# Patient Record
Sex: FEMALE | Race: WHITE
[De-identification: names, ages, dates, MRNs, and addresses within clinical notes are randomized per-mention and may not be internally consistent; named-entity substitution may affect disease eponyms.]

---

## 2017-11-07 ENCOUNTER — ASOB RESULT (OUTPATIENT)
Age: 27
End: 2017-11-07

## 2017-11-07 ENCOUNTER — APPOINTMENT (OUTPATIENT)
Dept: ANTEPARTUM | Facility: CLINIC | Age: 27
End: 2017-11-07
Payer: COMMERCIAL

## 2017-11-07 PROCEDURE — 76830 TRANSVAGINAL US NON-OB: CPT

## 2017-11-07 PROCEDURE — 76857 US EXAM PELVIC LIMITED: CPT

## 2018-03-19 ENCOUNTER — EMERGENCY (EMERGENCY)
Facility: HOSPITAL | Age: 28
LOS: 0 days | Discharge: HOME | End: 2018-03-19
Attending: EMERGENCY MEDICINE | Admitting: EMERGENCY MEDICINE
Payer: COMMERCIAL

## 2018-03-19 VITALS
RESPIRATION RATE: 18 BRPM | HEART RATE: 76 BPM | OXYGEN SATURATION: 100 % | DIASTOLIC BLOOD PRESSURE: 65 MMHG | SYSTOLIC BLOOD PRESSURE: 105 MMHG | TEMPERATURE: 98 F

## 2018-03-19 VITALS
HEIGHT: 64 IN | DIASTOLIC BLOOD PRESSURE: 75 MMHG | HEART RATE: 92 BPM | OXYGEN SATURATION: 100 % | SYSTOLIC BLOOD PRESSURE: 113 MMHG | TEMPERATURE: 98 F | RESPIRATION RATE: 14 BRPM | WEIGHT: 154.1 LBS

## 2018-03-19 DIAGNOSIS — M25.562 PAIN IN LEFT KNEE: ICD-10-CM

## 2018-03-19 DIAGNOSIS — Z04.1 ENCOUNTER FOR EXAMINATION AND OBSERVATION FOLLOWING TRANSPORT ACCIDENT: ICD-10-CM

## 2018-03-19 DIAGNOSIS — M25.512 PAIN IN LEFT SHOULDER: ICD-10-CM

## 2018-03-19 PROCEDURE — 99284 EMERGENCY DEPT VISIT MOD MDM: CPT

## 2018-03-19 PROCEDURE — 72190 X-RAY EXAM OF PELVIS: CPT | Mod: 26

## 2018-03-19 PROCEDURE — 73564 X-RAY EXAM KNEE 4 OR MORE: CPT | Mod: 26,LT

## 2018-03-19 PROCEDURE — 71046 X-RAY EXAM CHEST 2 VIEWS: CPT | Mod: 26

## 2018-03-19 PROCEDURE — 73030 X-RAY EXAM OF SHOULDER: CPT | Mod: 26,LT

## 2018-03-19 RX ORDER — ACETAMINOPHEN 500 MG
650 TABLET ORAL ONCE
Qty: 0 | Refills: 0 | Status: COMPLETED | OUTPATIENT
Start: 2018-03-19 | End: 2018-03-19

## 2018-03-19 RX ADMIN — Medication 650 MILLIGRAM(S): at 09:47
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
## 2021-10-29 ENCOUNTER — EMERGENCY (EMERGENCY)
Facility: HOSPITAL | Age: 31
LOS: 0 days | Discharge: HOME | End: 2021-10-29
Attending: STUDENT IN AN ORGANIZED HEALTH CARE EDUCATION/TRAINING PROGRAM
Payer: COMMERCIAL

## 2021-10-29 VITALS
SYSTOLIC BLOOD PRESSURE: 108 MMHG | DIASTOLIC BLOOD PRESSURE: 92 MMHG | HEART RATE: 91 BPM | TEMPERATURE: 99 F | RESPIRATION RATE: 18 BRPM | OXYGEN SATURATION: 99 %

## 2021-10-29 VITALS — HEIGHT: 64 IN | WEIGHT: 199.96 LBS

## 2021-10-29 DIAGNOSIS — M54.9 DORSALGIA, UNSPECIFIED: ICD-10-CM

## 2021-10-29 DIAGNOSIS — S49.91XA UNSPECIFIED INJURY OF RIGHT SHOULDER AND UPPER ARM, INITIAL ENCOUNTER: ICD-10-CM
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
## 2021-10-29 DIAGNOSIS — M25.511 PAIN IN RIGHT SHOULDER: ICD-10-CM

## 2021-10-29 DIAGNOSIS — S49.92XA UNSPECIFIED INJURY OF LEFT SHOULDER AND UPPER ARM, INITIAL ENCOUNTER: ICD-10-CM

## 2021-10-29 DIAGNOSIS — Z98.84 BARIATRIC SURGERY STATUS: ICD-10-CM

## 2021-10-29 DIAGNOSIS — M25.512 PAIN IN LEFT SHOULDER: ICD-10-CM

## 2021-10-29 DIAGNOSIS — M25.562 PAIN IN LEFT KNEE: ICD-10-CM

## 2021-10-29 DIAGNOSIS — S09.93XA UNSPECIFIED INJURY OF FACE, INITIAL ENCOUNTER: ICD-10-CM
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
## 2021-10-29 DIAGNOSIS — M25.561 PAIN IN RIGHT KNEE: ICD-10-CM

## 2021-10-29 DIAGNOSIS — Z88.6 ALLERGY STATUS TO ANALGESIC AGENT: ICD-10-CM

## 2021-10-29 DIAGNOSIS — Z90.3 ACQUIRED ABSENCE OF STOMACH [PART OF]: Chronic | ICD-10-CM

## 2021-10-29 PROCEDURE — 73030 X-RAY EXAM OF SHOULDER: CPT | Mod: RT

## 2021-10-29 PROCEDURE — 76376 3D RENDER W/INTRP POSTPROCES: CPT

## 2021-10-29 PROCEDURE — 73030 X-RAY EXAM OF SHOULDER: CPT | Mod: 26,RT

## 2021-10-29 PROCEDURE — G1004: CPT

## 2021-10-29 PROCEDURE — 76376 3D RENDER W/INTRP POSTPROCES: CPT | Mod: 26

## 2021-10-29 PROCEDURE — 70486 CT MAXILLOFACIAL W/O DYE: CPT | Mod: MG

## 2021-10-29 PROCEDURE — 99284 EMERGENCY DEPT VISIT MOD MDM: CPT | Mod: 25

## 2021-10-29 PROCEDURE — 99284 EMERGENCY DEPT VISIT MOD MDM: CPT

## 2021-10-29 PROCEDURE — 70486 CT MAXILLOFACIAL W/O DYE: CPT | Mod: 26,MG

## 2021-10-29 RX ORDER — ACETAMINOPHEN 500 MG
650 TABLET ORAL ONCE
Refills: 0 | Status: COMPLETED | OUTPATIENT
Start: 2021-10-29 | End: 2021-10-29

## 2021-10-29 RX ADMIN — Medication 650 MILLIGRAM(S): at 14:52
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
## 2021-10-29 NOTE — ED STATDOCS - ATTENDING CONTRIBUTION TO CARE
I, Phoebe Reyez DO,  performed the initial face to face bedside interview with this patient regarding history of present illness, review of symptoms and relevant past medical, social and family history.  I completed an independent physical examination.  I was the initial provider who evaluated this patient. I have signed out the follow up of any pending tests (i.e. labs, radiological studies) to the ACP.  I have communicated the patient’s plan of care and disposition with the ACP.  The history, relevant review of systems, past medical and surgical history, medical decision making, and physical examination was documented by the scribe in my presence and I attest to the accuracy of the documentation.

## 2021-10-29 NOTE — ED STATDOCS - PATIENT PORTAL LINK FT
You can access the FollowMyHealth Patient Portal offered by [facility] by registering at the following website: [email or web address]. By joining [facility]’s FollowMyHealth portal, you will also be able to view your health information using other applications (apps) compatible with our system.
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
## 2021-10-29 NOTE — ED STATDOCS - MUSCULOSKELETAL, MLM
Right sided facial pain and with soft tissue swelling. Right shoulder tenderness with restricted ROM secondary to pain.
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
## 2022-09-02 DIAGNOSIS — K21.9 GASTRO-ESOPHAGEAL REFLUX DISEASE W/OUT ESOPHAGITIS: ICD-10-CM

## 2022-09-02 DIAGNOSIS — F41.9 ANXIETY DISORDER, UNSPECIFIED: ICD-10-CM

## 2022-12-21 ENCOUNTER — OUTPATIENT (OUTPATIENT)
Dept: OUTPATIENT SERVICES | Facility: HOSPITAL | Age: 32
LOS: 1 days | Discharge: HOME | End: 2022-12-21
Payer: COMMERCIAL

## 2022-12-21 DIAGNOSIS — Z90.3 ACQUIRED ABSENCE OF STOMACH [PART OF]: Chronic | ICD-10-CM

## 2022-12-21 DIAGNOSIS — D64.9 ANEMIA, UNSPECIFIED: ICD-10-CM

## 2022-12-28 ENCOUNTER — RESULT REVIEW (OUTPATIENT)
Age: 32
End: 2022-12-28

## 2022-12-28 ENCOUNTER — APPOINTMENT (OUTPATIENT)
Dept: HEMATOLOGY ONCOLOGY | Facility: CLINIC | Age: 32
End: 2022-12-28

## 2022-12-28 VITALS
HEIGHT: 64 IN | HEART RATE: 95 BPM | SYSTOLIC BLOOD PRESSURE: 102 MMHG | RESPIRATION RATE: 18 BRPM | DIASTOLIC BLOOD PRESSURE: 54 MMHG | BODY MASS INDEX: 36.77 KG/M2 | OXYGEN SATURATION: 97 % | TEMPERATURE: 97.6 F | WEIGHT: 215.38 LBS

## 2022-12-28 DIAGNOSIS — K91.2 POSTSURGICAL MALABSORPTION, NOT ELSEWHERE CLASSIFIED: ICD-10-CM

## 2022-12-28 DIAGNOSIS — Z78.9 OTHER SPECIFIED HEALTH STATUS: ICD-10-CM

## 2022-12-28 DIAGNOSIS — Z87.42 PERSONAL HISTORY OF OTHER DISEASES OF THE FEMALE GENITAL TRACT: ICD-10-CM

## 2022-12-28 DIAGNOSIS — Z83.2 FAMILY HISTORY OF DISEASES OF THE BLOOD AND BLOOD-FORMING ORGANS AND CERTAIN DISORDERS INVOLVING THE IMMUNE MECHANISM: ICD-10-CM

## 2022-12-28 DIAGNOSIS — D50.9 IRON DEFICIENCY ANEMIA, UNSPECIFIED: ICD-10-CM

## 2022-12-28 DIAGNOSIS — D56.9 THALASSEMIA, UNSPECIFIED: ICD-10-CM

## 2022-12-28 DIAGNOSIS — Z87.891 PERSONAL HISTORY OF NICOTINE DEPENDENCE: ICD-10-CM

## 2022-12-28 DIAGNOSIS — E53.8 DEFICIENCY OF OTHER SPECIFIED B GROUP VITAMINS: ICD-10-CM

## 2022-12-28 LAB
BASOPHILS # BLD AUTO: 0.04 K/UL — SIGNIFICANT CHANGE UP (ref 0–0.2)
BASOPHILS NFR BLD AUTO: 0.7 % — SIGNIFICANT CHANGE UP (ref 0–2)
EOSINOPHIL # BLD AUTO: 0.1 K/UL — SIGNIFICANT CHANGE UP (ref 0–0.5)
EOSINOPHIL NFR BLD AUTO: 1.8 % — SIGNIFICANT CHANGE UP (ref 0–6)
HCT VFR BLD CALC: 30.1 % — LOW (ref 34.5–45)
HGB BLD-MCNC: 9 G/DL — LOW (ref 11.5–15.5)
IMM GRANULOCYTES NFR BLD AUTO: 0.2 % — SIGNIFICANT CHANGE UP (ref 0–0.9)
LYMPHOCYTES # BLD AUTO: 1.78 K/UL — SIGNIFICANT CHANGE UP (ref 1–3.3)
LYMPHOCYTES # BLD AUTO: 31.4 % — SIGNIFICANT CHANGE UP (ref 13–44)
MCHC RBC-ENTMCNC: 23.1 PG — LOW (ref 27–34)
MCHC RBC-ENTMCNC: 29.9 GM/DL — LOW (ref 32–36)
MCV RBC AUTO: 77.2 FL — LOW (ref 80–100)
MONOCYTES # BLD AUTO: 0.45 K/UL — SIGNIFICANT CHANGE UP (ref 0–0.9)
MONOCYTES NFR BLD AUTO: 8 % — SIGNIFICANT CHANGE UP (ref 2–14)
NEUTROPHILS # BLD AUTO: 3.28 K/UL — SIGNIFICANT CHANGE UP (ref 1.8–7.4)
NEUTROPHILS NFR BLD AUTO: 57.9 % — SIGNIFICANT CHANGE UP (ref 43–77)
NRBC # BLD: 0 /100 WBCS — SIGNIFICANT CHANGE UP (ref 0–0)
PLATELET # BLD AUTO: 274 K/UL — SIGNIFICANT CHANGE UP (ref 150–400)
RBC # BLD: 3.9 M/UL — SIGNIFICANT CHANGE UP (ref 3.8–5.2)
RBC # FLD: 16.8 % — HIGH (ref 10.3–14.5)
WBC # BLD: 5.66 K/UL — SIGNIFICANT CHANGE UP (ref 3.8–10.5)
WBC # FLD AUTO: 5.66 K/UL — SIGNIFICANT CHANGE UP (ref 3.8–10.5)

## 2022-12-28 PROCEDURE — 83020 HEMOGLOBIN ELECTROPHORESIS: CPT | Mod: 26

## 2022-12-28 PROCEDURE — 99205 OFFICE O/P NEW HI 60 MIN: CPT

## 2022-12-28 RX ORDER — ESCITALOPRAM OXALATE 20 MG/1
20 TABLET ORAL
Refills: 0 | Status: ACTIVE | COMMUNITY

## 2022-12-28 RX ORDER — PANTOPRAZOLE 40 MG/1
40 TABLET, DELAYED RELEASE ORAL
Refills: 0 | Status: ACTIVE | COMMUNITY

## 2022-12-28 NOTE — RESULTS/DATA
[FreeTextEntry1] : WBC: 5.66,  Hgb: 9.0, Hct: 30.1,  MCV: 77.2, Plts: 274\par Ferritin, TSAT, ESR, B12, folate, Hgb Electrophoresis: pending\par \par Reviewed labs in HIE - has some labs from  when had gastric sleeve surgery - Hgb at this time >12.

## 2022-12-28 NOTE — REVIEW OF SYSTEMS
[Patient Intake Form Reviewed] : Patient intake form was reviewed [Fatigue] : fatigue [Constipation] : constipation [Dizziness] : dizziness [Anxiety] : anxiety [Easy Bruising] : a tendency for easy bruising [Negative] : Allergic/Immunologic [FreeTextEntry7] : GERD, reflux [FreeTextEntry9] : Worsening RLS [de-identified] : Headache, numbness/tingling right hand [de-identified] : Worried

## 2022-12-28 NOTE — HISTORY OF PRESENT ILLNESS
[de-identified] : KEKE CHOE is a 32 y.o. with a PMH significant for Anxiety, GERD, joint surgeries, and obesity -> gastric sleeve 7/2015 who was referred to our office for an evaluation of an anemia. \par \par Labs done 5/24/22 - WBC: 3.8,  Hgb: 9.6, Hct: 33.9,  MCV: 80, Plts: 334,  B12: 264\par Reviewed with patient on 8/8/22 office visit. Patient called to make appointment to see us in Sept, but then needed to cancel as her coworker was out due to open heart surgery. \par \par She now comes in today for her evaluation. She reports she has been getting more symptomatic with increasing fatigue, NIEVES, and lightheadedness.  Denies pica but has been having severe RLS.  She does have some neuropathies in her right hand, but reports this is from nerve damage in her neck and shoulder. 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 Was evaluated at [facility] and was told she needed to take iron.  \par She states she was not very compliant with this, and so her father got mad at her and made her take several iron pills at once.  This caused her to become very ill and ever since then she does not want to take any PO iron.  \par She was also supposed to be taking MVI's for bariatric patients but states she has not been compliant with these either as they taste like chalk.  Has never taken B12 separately. \par \par Patient reports prior to her bariatric surgery she was having very heavy periods - each cycle would often last about 2 weeks.  However since her surgery her periods are much more normal.  Last about 3 days, occasionally heavy but overall regular.  She denies any hx of excessive bleeding with any of her prior surgeries; no clotting issues. \par She does have a family hx of a clotting disorder - her father has APLAS - diagnosed after 3 mini-strokes and he is now on Coumadin.

## 2022-12-28 NOTE — ASSESSMENT
[FreeTextEntry1] : Patient is a 32 y.o. with a microcytic anemia and B12 deficiency.\par Etiology of anemia likely multifactorial. \par 1. Reported "Mediterranean"  anemia - Patient reports was diagnosed as a child but was treated with iron?? Also reports no one else in her family was diagnosed with this. RBC level not elevated.  Will send off Hgb Electrophoresis to see if she has beta thalassemia.  If normal can consider sending alpha thal testing. \par 2. Component of GYN losses from Hx of heavy periods. \par 3. Component of blood loss from recent surgeries - Knee 3/2022, Shoulder 4/2022. \par 4. 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  Patient has not been complaint with recommended supplements.  \par Plan: \par Start OTC B12 1000mcg SL daily. \par Check iron studies but suspect they will be low. \par Option for replacement are PO or IV iron.  Patient reports she will not be willing to take PO iron due to the psychological distress she had when taking as a child.  \par Would recommend IV iron (brand to be decided based on insurance - likely Venofer x 5 or Feraheme x 3).    \par Possible side effects reviewed - aware of potential for allergic reaction - possible symptoms discussed included anaphylactic reactions.  Discussed true anaphylactic reactions are rare, <1%, but if this should occur she may need to be taken in an ambulance to the ER.  There can be staining of the skin if the IV infiltrates, joint pains, and flu-like symptoms.  She was informed she should not get an MRI for at least a month after the last IV iron infusion.\par We discussed that there are different IV iron products so that if she is having issues with one product we might be able to change to a different one, but that her insurance plan ultimately needs to approve the iron product. \par All questions answered.  Consent form signed. \par Will call patient tomorrow to let her know is she should schedule the IV iron.  If she does will also give her B12 injections on these days as well. \par \par Elvin Be - PCP

## 2022-12-28 NOTE — PHYSICAL EXAM
[Obese] : obese [Normal] : affect appropriate [de-identified] : anicteric [de-identified] : no c/c/e [de-identified] : no rashes

## 2022-12-29 LAB
ERYTHROCYTE [SEDIMENTATION RATE] IN BLOOD: 75 MM/HR — HIGH (ref 0–15)
FERRITIN SERPL-MCNC: 1 NG/ML — LOW (ref 15–150)
FOLATE SERPL-MCNC: 9 NG/ML — SIGNIFICANT CHANGE UP
HEMOGLOBIN INTERPRETATION: SIGNIFICANT CHANGE UP
HGB A MFR BLD: 97.9 % — SIGNIFICANT CHANGE UP (ref 95.8–98)
HGB A2 MFR BLD: 2.1 % — SIGNIFICANT CHANGE UP (ref 2–3.2)
IRON SATN MFR SERPL: 12 UG/DL — LOW (ref 30–160)
IRON SATN MFR SERPL: 3 % — LOW (ref 14–50)
TIBC SERPL-MCNC: 439 UG/DL — HIGH (ref 220–430)
UIBC SERPL-MCNC: 427 UG/DL — HIGH (ref 110–370)
VIT B12 SERPL-MCNC: 304 PG/ML — SIGNIFICANT CHANGE UP (ref 232–1245)

## 2023-01-09 ENCOUNTER — APPOINTMENT (OUTPATIENT)
Dept: INFUSION THERAPY | Facility: CLINIC | Age: 33
End: 2023-01-09

## 2023-01-19 ENCOUNTER — APPOINTMENT (OUTPATIENT)
Dept: INFUSION THERAPY | Facility: CLINIC | Age: 33
End: 2023-01-19

## 2023-01-19 VITALS
WEIGHT: 221.31 LBS | SYSTOLIC BLOOD PRESSURE: 105 MMHG | HEART RATE: 99 BPM | RESPIRATION RATE: 17 BRPM | BODY MASS INDEX: 37.99 KG/M2 | OXYGEN SATURATION: 98 % | DIASTOLIC BLOOD PRESSURE: 67 MMHG | TEMPERATURE: 98.7 F

## 2023-01-26 ENCOUNTER — APPOINTMENT (OUTPATIENT)
Dept: INFUSION THERAPY | Facility: CLINIC | Age: 33
End: 2023-01-26

## 2023-01-26 VITALS
RESPIRATION RATE: 18 BRPM | DIASTOLIC BLOOD PRESSURE: 58 MMHG | TEMPERATURE: 98.5 F | SYSTOLIC BLOOD PRESSURE: 103 MMHG | OXYGEN SATURATION: 100 % | HEART RATE: 78 BPM

## 2023-02-01 ENCOUNTER — APPOINTMENT (OUTPATIENT)
Dept: INFUSION THERAPY | Facility: CLINIC | Age: 33
End: 2023-02-01

## 2023-02-01 VITALS
SYSTOLIC BLOOD PRESSURE: 106 MMHG | DIASTOLIC BLOOD PRESSURE: 59 MMHG | TEMPERATURE: 98.3 F | OXYGEN SATURATION: 100 % | HEART RATE: 64 BPM | RESPIRATION RATE: 17 BRPM

## 2023-02-28 ENCOUNTER — OUTPATIENT (OUTPATIENT)
Dept: OUTPATIENT SERVICES | Facility: HOSPITAL | Age: 33
LOS: 1 days | Discharge: HOME | End: 2023-02-28

## 2023-02-28 DIAGNOSIS — D64.9 ANEMIA, UNSPECIFIED: ICD-10-CM

## 2023-02-28 DIAGNOSIS — Z90.3 ACQUIRED ABSENCE OF STOMACH [PART OF]: Chronic | ICD-10-CM

## 2023-03-06 ENCOUNTER — RESULT REVIEW (OUTPATIENT)
Age: 33
End: 2023-03-06

## 2023-03-06 ENCOUNTER — APPOINTMENT (OUTPATIENT)
Dept: HEMATOLOGY ONCOLOGY | Facility: CLINIC | Age: 33
End: 2023-03-06

## 2023-03-06 LAB
BASOPHILS # BLD AUTO: 0.05 K/UL — SIGNIFICANT CHANGE UP (ref 0–0.2)
BASOPHILS NFR BLD AUTO: 0.8 % — SIGNIFICANT CHANGE UP (ref 0–2)
EOSINOPHIL # BLD AUTO: 0.14 K/UL — SIGNIFICANT CHANGE UP (ref 0–0.5)
EOSINOPHIL NFR BLD AUTO: 2.1 % — SIGNIFICANT CHANGE UP (ref 0–6)
HCT VFR BLD CALC: 40.7 % — SIGNIFICANT CHANGE UP (ref 34.5–45)
HGB BLD-MCNC: 13 G/DL — SIGNIFICANT CHANGE UP (ref 11.5–15.5)
IMM GRANULOCYTES NFR BLD AUTO: 0.2 % — SIGNIFICANT CHANGE UP (ref 0–0.9)
LYMPHOCYTES # BLD AUTO: 1.72 K/UL — SIGNIFICANT CHANGE UP (ref 1–3.3)
LYMPHOCYTES # BLD AUTO: 26 % — SIGNIFICANT CHANGE UP (ref 13–44)
MCHC RBC-ENTMCNC: 29.5 PG — SIGNIFICANT CHANGE UP (ref 27–34)
MCHC RBC-ENTMCNC: 31.9 GM/DL — LOW (ref 32–36)
MCV RBC AUTO: 92.3 FL — SIGNIFICANT CHANGE UP (ref 80–100)
MONOCYTES # BLD AUTO: 0.51 K/UL — SIGNIFICANT CHANGE UP (ref 0–0.9)
MONOCYTES NFR BLD AUTO: 7.7 % — SIGNIFICANT CHANGE UP (ref 2–14)
NEUTROPHILS # BLD AUTO: 4.19 K/UL — SIGNIFICANT CHANGE UP (ref 1.8–7.4)
NEUTROPHILS NFR BLD AUTO: 63.2 % — SIGNIFICANT CHANGE UP (ref 43–77)
NRBC # BLD: 0 /100 WBCS — SIGNIFICANT CHANGE UP (ref 0–0)
PLATELET # BLD AUTO: 205 K/UL — SIGNIFICANT CHANGE UP (ref 150–400)
RBC # BLD: 4.41 M/UL — SIGNIFICANT CHANGE UP (ref 3.8–5.2)
RBC # FLD: 21.4 % — HIGH (ref 10.3–14.5)
WBC # BLD: 6.62 K/UL — SIGNIFICANT CHANGE UP (ref 3.8–10.5)
WBC # FLD AUTO: 6.62 K/UL — SIGNIFICANT CHANGE UP (ref 3.8–10.5)

## 2023-03-07 ENCOUNTER — NON-APPOINTMENT (OUTPATIENT)
Age: 33
End: 2023-03-07

## 2023-03-07 LAB
ERYTHROCYTE [SEDIMENTATION RATE] IN BLOOD: 31 MM/HR — HIGH (ref 0–15)
FERRITIN SERPL-MCNC: 66 NG/ML — SIGNIFICANT CHANGE UP (ref 15–150)
IRON SATN MFR SERPL: 19 % — SIGNIFICANT CHANGE UP (ref 14–50)
IRON SATN MFR SERPL: 48 UG/DL — SIGNIFICANT CHANGE UP (ref 30–160)
TIBC SERPL-MCNC: 249 UG/DL — SIGNIFICANT CHANGE UP (ref 220–430)
UIBC SERPL-MCNC: 202 UG/DL — SIGNIFICANT CHANGE UP (ref 110–370)
VIT B12 SERPL-MCNC: 360 PG/ML — SIGNIFICANT CHANGE UP (ref 232–1245)

## 2023-03-10 ENCOUNTER — NON-APPOINTMENT (OUTPATIENT)
Age: 33
End: 2023-03-10

## 2023-03-17 ENCOUNTER — APPOINTMENT (OUTPATIENT)
Dept: INFUSION THERAPY | Facility: CLINIC | Age: 33
End: 2023-03-17

## 2023-03-17 VITALS
BODY MASS INDEX: 39.02 KG/M2 | DIASTOLIC BLOOD PRESSURE: 77 MMHG | SYSTOLIC BLOOD PRESSURE: 115 MMHG | RESPIRATION RATE: 18 BRPM | TEMPERATURE: 98.3 F | HEIGHT: 64 IN | HEART RATE: 74 BPM | WEIGHT: 228.56 LBS | OXYGEN SATURATION: 98 %

## 2023-03-20 DIAGNOSIS — D51.9 VITAMIN B12 DEFICIENCY ANEMIA, UNSPECIFIED: ICD-10-CM

## 2023-03-20 DIAGNOSIS — D50.9 IRON DEFICIENCY ANEMIA, UNSPECIFIED: ICD-10-CM

## 2023-03-24 ENCOUNTER — APPOINTMENT (OUTPATIENT)
Dept: INFUSION THERAPY | Facility: CLINIC | Age: 33
End: 2023-03-24

## 2023-04-28 ENCOUNTER — RESULT REVIEW (OUTPATIENT)
Age: 33
End: 2023-04-28

## 2023-04-28 ENCOUNTER — APPOINTMENT (OUTPATIENT)
Dept: HEMATOLOGY ONCOLOGY | Facility: CLINIC | Age: 33
End: 2023-04-28

## 2023-04-28 LAB
BASOPHILS # BLD AUTO: 0.03 K/UL — SIGNIFICANT CHANGE UP (ref 0–0.2)
BASOPHILS NFR BLD AUTO: 0.8 % — SIGNIFICANT CHANGE UP (ref 0–2)
EOSINOPHIL # BLD AUTO: 0.04 K/UL — SIGNIFICANT CHANGE UP (ref 0–0.5)
EOSINOPHIL NFR BLD AUTO: 1 % — SIGNIFICANT CHANGE UP (ref 0–6)
ERYTHROCYTE [SEDIMENTATION RATE] IN BLOOD: 11 MM/HR — SIGNIFICANT CHANGE UP (ref 0–15)
FERRITIN SERPL-MCNC: 71 NG/ML — SIGNIFICANT CHANGE UP (ref 15–150)
HCT VFR BLD CALC: 42.2 % — SIGNIFICANT CHANGE UP (ref 34.5–45)
HGB BLD-MCNC: 13.9 G/DL — SIGNIFICANT CHANGE UP (ref 11.5–15.5)
IMM GRANULOCYTES NFR BLD AUTO: 0.5 % — SIGNIFICANT CHANGE UP (ref 0–0.9)
IRON SATN MFR SERPL: 132 UG/DL — SIGNIFICANT CHANGE UP (ref 30–160)
IRON SATN MFR SERPL: 56 % — HIGH (ref 14–50)
LYMPHOCYTES # BLD AUTO: 1.03 K/UL — SIGNIFICANT CHANGE UP (ref 1–3.3)
LYMPHOCYTES # BLD AUTO: 25.8 % — SIGNIFICANT CHANGE UP (ref 13–44)
MCHC RBC-ENTMCNC: 32 PG — SIGNIFICANT CHANGE UP (ref 27–34)
MCHC RBC-ENTMCNC: 32.9 GM/DL — SIGNIFICANT CHANGE UP (ref 32–36)
MCV RBC AUTO: 97.2 FL — SIGNIFICANT CHANGE UP (ref 80–100)
MONOCYTES # BLD AUTO: 0.31 K/UL — SIGNIFICANT CHANGE UP (ref 0–0.9)
MONOCYTES NFR BLD AUTO: 7.8 % — SIGNIFICANT CHANGE UP (ref 2–14)
NEUTROPHILS # BLD AUTO: 2.57 K/UL — SIGNIFICANT CHANGE UP (ref 1.8–7.4)
NEUTROPHILS NFR BLD AUTO: 64.1 % — SIGNIFICANT CHANGE UP (ref 43–77)
NRBC # BLD: 0 /100 WBCS — SIGNIFICANT CHANGE UP (ref 0–0)
PLATELET # BLD AUTO: 193 K/UL — SIGNIFICANT CHANGE UP (ref 150–400)
RBC # BLD: 4.34 M/UL — SIGNIFICANT CHANGE UP (ref 3.8–5.2)
RBC # FLD: 12.8 % — SIGNIFICANT CHANGE UP (ref 10.3–14.5)
TIBC SERPL-MCNC: 234 UG/DL — SIGNIFICANT CHANGE UP (ref 220–430)
UIBC SERPL-MCNC: 102 UG/DL — LOW (ref 110–370)
VIT B12 SERPL-MCNC: 385 PG/ML — SIGNIFICANT CHANGE UP (ref 232–1245)
WBC # BLD: 4 K/UL — SIGNIFICANT CHANGE UP (ref 3.8–10.5)
WBC # FLD AUTO: 4 K/UL — SIGNIFICANT CHANGE UP (ref 3.8–10.5)

## 2023-05-01 ENCOUNTER — NON-APPOINTMENT (OUTPATIENT)
Age: 33
End: 2023-05-01

## 2023-05-30 ENCOUNTER — NON-APPOINTMENT (OUTPATIENT)
Age: 33
End: 2023-05-30

## 2023-05-31 ENCOUNTER — OUTPATIENT (OUTPATIENT)
Dept: OUTPATIENT SERVICES | Facility: HOSPITAL | Age: 33
LOS: 1 days | Discharge: HOME | End: 2023-05-31

## 2023-05-31 DIAGNOSIS — Z90.3 ACQUIRED ABSENCE OF STOMACH [PART OF]: Chronic | ICD-10-CM

## 2023-05-31 DIAGNOSIS — D50.9 IRON DEFICIENCY ANEMIA, UNSPECIFIED: ICD-10-CM

## 2023-06-05 ENCOUNTER — RESULT REVIEW (OUTPATIENT)
Age: 33
End: 2023-06-05

## 2023-06-05 ENCOUNTER — APPOINTMENT (OUTPATIENT)
Dept: HEMATOLOGY ONCOLOGY | Facility: CLINIC | Age: 33
End: 2023-06-05

## 2023-06-05 LAB
BASOPHILS # BLD AUTO: 0.02 K/UL — SIGNIFICANT CHANGE UP (ref 0–0.2)
BASOPHILS NFR BLD AUTO: 0.4 % — SIGNIFICANT CHANGE UP (ref 0–2)
EOSINOPHIL # BLD AUTO: 0.08 K/UL — SIGNIFICANT CHANGE UP (ref 0–0.5)
EOSINOPHIL NFR BLD AUTO: 1.4 % — SIGNIFICANT CHANGE UP (ref 0–6)
HCT VFR BLD CALC: 39.1 % — SIGNIFICANT CHANGE UP (ref 34.5–45)
HGB BLD-MCNC: 12.9 G/DL — SIGNIFICANT CHANGE UP (ref 11.5–15.5)
IMM GRANULOCYTES NFR BLD AUTO: 0.4 % — SIGNIFICANT CHANGE UP (ref 0–0.9)
LYMPHOCYTES # BLD AUTO: 1.49 K/UL — SIGNIFICANT CHANGE UP (ref 1–3.3)
LYMPHOCYTES # BLD AUTO: 26.4 % — SIGNIFICANT CHANGE UP (ref 13–44)
MCHC RBC-ENTMCNC: 32.2 PG — SIGNIFICANT CHANGE UP (ref 27–34)
MCHC RBC-ENTMCNC: 33 GM/DL — SIGNIFICANT CHANGE UP (ref 32–36)
MCV RBC AUTO: 97.5 FL — SIGNIFICANT CHANGE UP (ref 80–100)
MONOCYTES # BLD AUTO: 0.38 K/UL — SIGNIFICANT CHANGE UP (ref 0–0.9)
MONOCYTES NFR BLD AUTO: 6.7 % — SIGNIFICANT CHANGE UP (ref 2–14)
NEUTROPHILS # BLD AUTO: 3.66 K/UL — SIGNIFICANT CHANGE UP (ref 1.8–7.4)
NEUTROPHILS NFR BLD AUTO: 64.7 % — SIGNIFICANT CHANGE UP (ref 43–77)
NRBC # BLD: 0 /100 WBCS — SIGNIFICANT CHANGE UP (ref 0–0)
PLATELET # BLD AUTO: 200 K/UL — SIGNIFICANT CHANGE UP (ref 150–400)
RBC # BLD: 4.01 M/UL — SIGNIFICANT CHANGE UP (ref 3.8–5.2)
RBC # FLD: 13.2 % — SIGNIFICANT CHANGE UP (ref 10.3–14.5)
WBC # BLD: 5.65 K/UL — SIGNIFICANT CHANGE UP (ref 3.8–10.5)
WBC # FLD AUTO: 5.65 K/UL — SIGNIFICANT CHANGE UP (ref 3.8–10.5)

## 2023-06-06 LAB
ERYTHROCYTE [SEDIMENTATION RATE] IN BLOOD: 32 MM/HR — HIGH (ref 0–15)
FERRITIN SERPL-MCNC: 72 NG/ML — SIGNIFICANT CHANGE UP (ref 15–150)
IRON SATN MFR SERPL: 12 % — LOW (ref 14–50)
IRON SATN MFR SERPL: 29 UG/DL — LOW (ref 30–160)
TIBC SERPL-MCNC: 241 UG/DL — SIGNIFICANT CHANGE UP (ref 220–430)
UIBC SERPL-MCNC: 212 UG/DL — SIGNIFICANT CHANGE UP (ref 110–370)
VIT B12 SERPL-MCNC: 349 PG/ML — SIGNIFICANT CHANGE UP (ref 232–1245)

## 2023-06-12 ENCOUNTER — APPOINTMENT (OUTPATIENT)
Dept: INFUSION THERAPY | Facility: CLINIC | Age: 33
End: 2023-06-12

## 2023-06-13 DIAGNOSIS — D51.9 VITAMIN B12 DEFICIENCY ANEMIA, UNSPECIFIED: ICD-10-CM

## 2023-06-19 ENCOUNTER — APPOINTMENT (OUTPATIENT)
Dept: INFUSION THERAPY | Facility: CLINIC | Age: 33
End: 2023-06-19

## 2023-06-19 VITALS
WEIGHT: 244.56 LBS | RESPIRATION RATE: 18 BRPM | HEART RATE: 71 BPM | TEMPERATURE: 98.4 F | DIASTOLIC BLOOD PRESSURE: 78 MMHG | BODY MASS INDEX: 41.98 KG/M2 | SYSTOLIC BLOOD PRESSURE: 127 MMHG | OXYGEN SATURATION: 98 %

## 2023-06-27 ENCOUNTER — NON-APPOINTMENT (OUTPATIENT)
Age: 33
End: 2023-06-27

## 2023-07-03 ENCOUNTER — APPOINTMENT (OUTPATIENT)
Dept: MATERNAL FETAL MEDICINE | Facility: CLINIC | Age: 33
End: 2023-07-03
Payer: COMMERCIAL

## 2023-07-03 ENCOUNTER — ASOB RESULT (OUTPATIENT)
Age: 33
End: 2023-07-03

## 2023-07-03 PROCEDURE — 99202 OFFICE O/P NEW SF 15 MIN: CPT | Mod: 95

## 2023-07-19 ENCOUNTER — APPOINTMENT (OUTPATIENT)
Dept: ANTEPARTUM | Facility: CLINIC | Age: 33
End: 2023-07-19

## 2023-07-24 ENCOUNTER — RESULT REVIEW (OUTPATIENT)
Age: 33
End: 2023-07-24

## 2023-07-24 ENCOUNTER — APPOINTMENT (OUTPATIENT)
Dept: HEMATOLOGY ONCOLOGY | Facility: CLINIC | Age: 33
End: 2023-07-24

## 2023-07-24 LAB
BASOPHILS # BLD AUTO: 0.03 K/UL — SIGNIFICANT CHANGE UP (ref 0–0.2)
BASOPHILS NFR BLD AUTO: 0.5 % — SIGNIFICANT CHANGE UP (ref 0–2)
EOSINOPHIL # BLD AUTO: 0.06 K/UL — SIGNIFICANT CHANGE UP (ref 0–0.5)
EOSINOPHIL NFR BLD AUTO: 1 % — SIGNIFICANT CHANGE UP (ref 0–6)
HCT VFR BLD CALC: 39.7 % — SIGNIFICANT CHANGE UP (ref 34.5–45)
HGB BLD-MCNC: 13.2 G/DL — SIGNIFICANT CHANGE UP (ref 11.5–15.5)
IMM GRANULOCYTES NFR BLD AUTO: 0.5 % — SIGNIFICANT CHANGE UP (ref 0–0.9)
LYMPHOCYTES # BLD AUTO: 1.38 K/UL — SIGNIFICANT CHANGE UP (ref 1–3.3)
LYMPHOCYTES # BLD AUTO: 22.2 % — SIGNIFICANT CHANGE UP (ref 13–44)
MCHC RBC-ENTMCNC: 32 PG — SIGNIFICANT CHANGE UP (ref 27–34)
MCHC RBC-ENTMCNC: 33.2 GM/DL — SIGNIFICANT CHANGE UP (ref 32–36)
MCV RBC AUTO: 96.4 FL — SIGNIFICANT CHANGE UP (ref 80–100)
MONOCYTES # BLD AUTO: 0.41 K/UL — SIGNIFICANT CHANGE UP (ref 0–0.9)
MONOCYTES NFR BLD AUTO: 6.6 % — SIGNIFICANT CHANGE UP (ref 2–14)
NEUTROPHILS # BLD AUTO: 4.3 K/UL — SIGNIFICANT CHANGE UP (ref 1.8–7.4)
NEUTROPHILS NFR BLD AUTO: 69.2 % — SIGNIFICANT CHANGE UP (ref 43–77)
NRBC # BLD: 0 /100 WBCS — SIGNIFICANT CHANGE UP (ref 0–0)
PLATELET # BLD AUTO: 183 K/UL — SIGNIFICANT CHANGE UP (ref 150–400)
RBC # BLD: 4.12 M/UL — SIGNIFICANT CHANGE UP (ref 3.8–5.2)
RBC # FLD: 12.6 % — SIGNIFICANT CHANGE UP (ref 10.3–14.5)
WBC # BLD: 6.21 K/UL — SIGNIFICANT CHANGE UP (ref 3.8–10.5)
WBC # FLD AUTO: 6.21 K/UL — SIGNIFICANT CHANGE UP (ref 3.8–10.5)

## 2023-07-25 ENCOUNTER — APPOINTMENT (OUTPATIENT)
Dept: ANTEPARTUM | Facility: CLINIC | Age: 33
End: 2023-07-25
Payer: COMMERCIAL

## 2023-07-25 ENCOUNTER — APPOINTMENT (OUTPATIENT)
Dept: MATERNAL FETAL MEDICINE | Facility: CLINIC | Age: 33
End: 2023-07-25
Payer: COMMERCIAL

## 2023-07-25 ENCOUNTER — ASOB RESULT (OUTPATIENT)
Age: 33
End: 2023-07-25

## 2023-07-25 ENCOUNTER — NON-APPOINTMENT (OUTPATIENT)
Age: 33
End: 2023-07-25

## 2023-07-25 DIAGNOSIS — O99.840 BARIATRIC SURGERY STATUS COMPLICATING PREGNANCY, UNSPECIFIED TRIMESTER: ICD-10-CM

## 2023-07-25 DIAGNOSIS — D50.9 IRON DEFICIENCY ANEMIA, UNSPECIFIED: ICD-10-CM

## 2023-07-25 DIAGNOSIS — D56.9 THALASSEMIA, UNSPECIFIED: ICD-10-CM

## 2023-07-25 DIAGNOSIS — D64.9 ANEMIA, UNSPECIFIED: ICD-10-CM

## 2023-07-25 DIAGNOSIS — O99.212 OBESITY COMPLICATING PREGNANCY, SECOND TRIMESTER: ICD-10-CM

## 2023-07-25 DIAGNOSIS — Z90.3 POSTSURGICAL MALABSORPTION, NOT ELSEWHERE CLASSIFIED: ICD-10-CM

## 2023-07-25 DIAGNOSIS — K91.2 POSTSURGICAL MALABSORPTION, NOT ELSEWHERE CLASSIFIED: ICD-10-CM

## 2023-07-25 LAB
ERYTHROCYTE [SEDIMENTATION RATE] IN BLOOD: 17 MM/HR — HIGH (ref 0–15)
FERRITIN SERPL-MCNC: 100 NG/ML — SIGNIFICANT CHANGE UP (ref 15–150)
IRON SATN MFR SERPL: 18 % — SIGNIFICANT CHANGE UP (ref 14–50)
IRON SATN MFR SERPL: 32 UG/DL — SIGNIFICANT CHANGE UP (ref 30–160)
TIBC SERPL-MCNC: 178 UG/DL — LOW (ref 220–430)
UIBC SERPL-MCNC: 146 UG/DL — SIGNIFICANT CHANGE UP (ref 110–370)
VIT B12 SERPL-MCNC: 384 PG/ML — SIGNIFICANT CHANGE UP (ref 232–1245)

## 2023-07-25 PROCEDURE — 36415 COLL VENOUS BLD VENIPUNCTURE: CPT

## 2023-07-25 PROCEDURE — 76815 OB US LIMITED FETUS(S): CPT

## 2023-07-25 PROCEDURE — 99205 OFFICE O/P NEW HI 60 MIN: CPT

## 2023-07-25 RX ORDER — PRENATAL VIT NO.126/IRON/FOLIC 28MG-0.8MG
28-0.8 TABLET ORAL
Refills: 0 | Status: ACTIVE | COMMUNITY

## 2023-07-25 NOTE — DATA REVIEWED
[FreeTextEntry1] : Sonogram today shows the CRL to be out of the range for NT, however the sonogram appears normal. A NIPT was drawn today. \par \par We reviewed the consultation from [facility] as well as hematology, with the downgrading of the suspicion for Thal.  Her history does note that she was told of her diagnosis at age 7, but only became symptomatic post gastric banding, requiring her first Iron infusion about 1-2 years ago. \par \par Her most recent infusion was 5 weeks ago, and CBC yesterday shows a HCT of almost 40. \par \par We discussed the physiologic anemia in pregnancy, and the need to monitor throughout the pregnancy, however I don’t anticipate this being an issue \par \par Routine management going forward is suggested.

## 2023-07-25 NOTE — DISCUSSION/SUMMARY
[FreeTextEntry1] : Patient will followup with her hematologist to determine if additional screening for Thalassemia is needed. \par \par Level 2 sonogram at 20 weeks.

## 2023-07-31 ENCOUNTER — NON-APPOINTMENT (OUTPATIENT)
Age: 33
End: 2023-07-31

## 2023-09-12 ENCOUNTER — APPOINTMENT (OUTPATIENT)
Dept: ANTEPARTUM | Facility: CLINIC | Age: 33
End: 2023-09-12
Payer: COMMERCIAL

## 2023-09-12 ENCOUNTER — ASOB RESULT (OUTPATIENT)
Age: 33
End: 2023-09-12

## 2023-09-12 PROCEDURE — 76811 OB US DETAILED SNGL FETUS: CPT

## 2023-09-12 PROCEDURE — 76817 TRANSVAGINAL US OBSTETRIC: CPT

## 2023-10-31 ENCOUNTER — ASOB RESULT (OUTPATIENT)
Age: 33
End: 2023-10-31

## 2023-10-31 ENCOUNTER — APPOINTMENT (OUTPATIENT)
Dept: ANTEPARTUM | Facility: CLINIC | Age: 33
End: 2023-10-31
Payer: COMMERCIAL

## 2023-10-31 PROCEDURE — 76816 OB US FOLLOW-UP PER FETUS: CPT

## 2023-11-29 ENCOUNTER — ASOB RESULT (OUTPATIENT)
Age: 33
End: 2023-11-29

## 2023-11-29 ENCOUNTER — APPOINTMENT (OUTPATIENT)
Dept: ANTEPARTUM | Facility: CLINIC | Age: 33
End: 2023-11-29
Payer: COMMERCIAL

## 2023-11-29 PROCEDURE — 76816 OB US FOLLOW-UP PER FETUS: CPT

## 2023-11-29 PROCEDURE — 76819 FETAL BIOPHYS PROFIL W/O NST: CPT

## 2023-12-27 ENCOUNTER — ASOB RESULT (OUTPATIENT)
Age: 33
End: 2023-12-27

## 2023-12-27 ENCOUNTER — APPOINTMENT (OUTPATIENT)
Dept: ANTEPARTUM | Facility: CLINIC | Age: 33
End: 2023-12-27
Payer: COMMERCIAL

## 2023-12-27 PROCEDURE — ZZZZZ: CPT

## 2023-12-27 PROCEDURE — 76816 OB US FOLLOW-UP PER FETUS: CPT

## 2023-12-27 PROCEDURE — 76818 FETAL BIOPHYS PROFILE W/NST: CPT

## 2024-01-02 ENCOUNTER — ASOB RESULT (OUTPATIENT)
Age: 34
End: 2024-01-02

## 2024-01-02 ENCOUNTER — APPOINTMENT (OUTPATIENT)
Dept: ANTEPARTUM | Facility: CLINIC | Age: 34
End: 2024-01-02
Payer: COMMERCIAL

## 2024-01-02 PROCEDURE — 76818 FETAL BIOPHYS PROFILE W/NST: CPT

## 2024-01-09 ENCOUNTER — APPOINTMENT (OUTPATIENT)
Dept: ANTEPARTUM | Facility: CLINIC | Age: 34
End: 2024-01-09

## 2024-01-10 ENCOUNTER — APPOINTMENT (OUTPATIENT)
Dept: ANTEPARTUM | Facility: CLINIC | Age: 34
End: 2024-01-10
Payer: COMMERCIAL

## 2024-01-10 ENCOUNTER — ASOB RESULT (OUTPATIENT)
Age: 34
End: 2024-01-10

## 2024-01-10 PROCEDURE — 59025 FETAL NON-STRESS TEST: CPT

## 2024-01-16 ENCOUNTER — APPOINTMENT (OUTPATIENT)
Dept: ANTEPARTUM | Facility: CLINIC | Age: 34
End: 2024-01-16
Payer: COMMERCIAL

## 2024-01-16 ENCOUNTER — APPOINTMENT (OUTPATIENT)
Dept: ANTEPARTUM | Facility: CLINIC | Age: 34
End: 2024-01-16

## 2024-01-16 ENCOUNTER — ASOB RESULT (OUTPATIENT)
Age: 34
End: 2024-01-16

## 2024-01-16 PROCEDURE — 76818 FETAL BIOPHYS PROFILE W/NST: CPT

## 2024-01-23 ENCOUNTER — INPATIENT (INPATIENT)
Facility: HOSPITAL | Age: 34
LOS: 2 days | Discharge: HOME | End: 2024-01-26
Attending: OBSTETRICS & GYNECOLOGY | Admitting: OBSTETRICS & GYNECOLOGY
Payer: COMMERCIAL

## 2024-01-23 ENCOUNTER — APPOINTMENT (OUTPATIENT)
Dept: ANTEPARTUM | Facility: CLINIC | Age: 34
End: 2024-01-23

## 2024-01-23 VITALS
WEIGHT: 279.11 LBS | SYSTOLIC BLOOD PRESSURE: 114 MMHG | TEMPERATURE: 99 F | HEART RATE: 60 BPM | OXYGEN SATURATION: 100 % | RESPIRATION RATE: 16 BRPM | HEIGHT: 64 IN | DIASTOLIC BLOOD PRESSURE: 65 MMHG

## 2024-01-23 DIAGNOSIS — O26.899 OTHER SPECIFIED PREGNANCY RELATED CONDITIONS, UNSPECIFIED TRIMESTER: ICD-10-CM

## 2024-01-23 DIAGNOSIS — Z90.3 ACQUIRED ABSENCE OF STOMACH [PART OF]: Chronic | ICD-10-CM

## 2024-01-23 LAB
BASOPHILS # BLD AUTO: 0.04 K/UL — SIGNIFICANT CHANGE UP (ref 0–0.2)
BASOPHILS NFR BLD AUTO: 0.5 % — SIGNIFICANT CHANGE UP (ref 0–2)
EOSINOPHIL # BLD AUTO: 0.06 K/UL — SIGNIFICANT CHANGE UP (ref 0–0.5)
EOSINOPHIL NFR BLD AUTO: 0.7 % — SIGNIFICANT CHANGE UP (ref 0–6)
HCT VFR BLD CALC: 37.9 % — SIGNIFICANT CHANGE UP (ref 34.5–45)
HGB BLD-MCNC: 13 G/DL — SIGNIFICANT CHANGE UP (ref 11.5–15.5)
IMM GRANULOCYTES NFR BLD AUTO: 0.8 % — SIGNIFICANT CHANGE UP (ref 0–0.9)
LYMPHOCYTES # BLD AUTO: 1.76 K/UL — SIGNIFICANT CHANGE UP (ref 1–3.3)
LYMPHOCYTES # BLD AUTO: 21.2 % — SIGNIFICANT CHANGE UP (ref 13–44)
MCHC RBC-ENTMCNC: 32.3 PG — SIGNIFICANT CHANGE UP (ref 27–34)
MCHC RBC-ENTMCNC: 34.3 GM/DL — SIGNIFICANT CHANGE UP (ref 32–36)
MCV RBC AUTO: 94.3 FL — SIGNIFICANT CHANGE UP (ref 80–100)
MONOCYTES # BLD AUTO: 0.66 K/UL — SIGNIFICANT CHANGE UP (ref 0–0.9)
MONOCYTES NFR BLD AUTO: 7.9 % — SIGNIFICANT CHANGE UP (ref 2–14)
NEUTROPHILS # BLD AUTO: 5.73 K/UL — SIGNIFICANT CHANGE UP (ref 1.8–7.4)
NEUTROPHILS NFR BLD AUTO: 68.9 % — SIGNIFICANT CHANGE UP (ref 43–77)
PLATELET # BLD AUTO: 206 K/UL — SIGNIFICANT CHANGE UP (ref 150–400)
RBC # BLD: 4.02 M/UL — SIGNIFICANT CHANGE UP (ref 3.8–5.2)
RBC # FLD: 13 % — SIGNIFICANT CHANGE UP (ref 10.3–14.5)
WBC # BLD: 8.32 K/UL — SIGNIFICANT CHANGE UP (ref 3.8–10.5)
WBC # FLD AUTO: 8.32 K/UL — SIGNIFICANT CHANGE UP (ref 3.8–10.5)

## 2024-01-23 PROCEDURE — 85018 HEMOGLOBIN: CPT

## 2024-01-23 PROCEDURE — 94760 N-INVAS EAR/PLS OXIMETRY 1: CPT

## 2024-01-23 PROCEDURE — C1889: CPT

## 2024-01-23 PROCEDURE — 86850 RBC ANTIBODY SCREEN: CPT

## 2024-01-23 PROCEDURE — 86901 BLOOD TYPING SEROLOGIC RH(D): CPT

## 2024-01-23 PROCEDURE — 36415 COLL VENOUS BLD VENIPUNCTURE: CPT

## 2024-01-23 PROCEDURE — 86780 TREPONEMA PALLIDUM: CPT

## 2024-01-23 PROCEDURE — 85025 COMPLETE CBC W/AUTO DIFF WBC: CPT

## 2024-01-23 PROCEDURE — 85014 HEMATOCRIT: CPT

## 2024-01-23 PROCEDURE — 59050 FETAL MONITOR W/REPORT: CPT

## 2024-01-23 PROCEDURE — 86900 BLOOD TYPING SEROLOGIC ABO: CPT

## 2024-01-23 RX ORDER — OXYTOCIN 10 UNIT/ML
VIAL (ML) INJECTION
Qty: 20 | Refills: 0 | Status: DISCONTINUED | OUTPATIENT
Start: 2024-01-23 | End: 2024-01-24

## 2024-01-23 RX ORDER — SODIUM CHLORIDE 9 MG/ML
1000 INJECTION, SOLUTION INTRAVENOUS
Refills: 0 | Status: DISCONTINUED | OUTPATIENT
Start: 2024-01-23 | End: 2024-01-24

## 2024-01-23 RX ORDER — CITRIC ACID/SODIUM CITRATE 300-500 MG
30 SOLUTION, ORAL ORAL ONCE
Refills: 0 | Status: DISCONTINUED | OUTPATIENT
Start: 2024-01-23 | End: 2024-01-24

## 2024-01-23 RX ORDER — CHLORHEXIDINE GLUCONATE 213 G/1000ML
1 SOLUTION TOPICAL DAILY
Refills: 0 | Status: DISCONTINUED | OUTPATIENT
Start: 2024-01-23 | End: 2024-01-24

## 2024-01-23 NOTE — OB PROVIDER H&P - NSLOWPPHRISK_OBGYN_A_OB
No previous uterine incision/[place] Pregnancy/Less than or equal to 4 previous vaginal births/No known bleeding disorder/No history of postpartum hemorrhage

## 2024-01-23 NOTE — OB PROVIDER H&P - HISTORY OF PRESENT ILLNESS
KEKE CHOE is a 32yo  at 40w by LMP 23 MARILU 24 presenting for term IOL. Denies leakage of fluids, vaginal bleeding, painful contractions. Reports active fetal movement.     PNC w Dr. Menendez:  1. Carpal tunnel - like symptoms    OBhx: denies  Gyn: denies  PMH: anxiety, thalasemia minor  PSH: bariatric surgery, appendectomy  Med: PNV  Allergies: NKDA

## 2024-01-23 NOTE — OB PROVIDER H&P - NSHPPHYSICALEXAM_GEN_ALL_CORE
Vitals per CPN    General: AAOx3, NAD  Abd: Soft, nontender, gravid  SVE: 1/70/-3 intact    FHT: 130bpm mod variability +accels -decels  [place]:  irregular    MFM sono (12/27): vertex, anterior, [id number] (35%ile)  Bedside sono: cephalic

## 2024-01-23 NOTE — OB RN PATIENT PROFILE - FALL HARM RISK - UNIVERSAL INTERVENTIONS
Bed in lowest position, wheels locked, appropriate side rails in place/Call bell, personal items and telephone in reach/Instruct patient to call for assistance before getting out of bed or chair/Non-slip footwear when patient is out of bed/[place] to call system/Physically safe environment - no spills, clutter or unnecessary equipment/Purposeful Proactive Rounding/Room/bathroom lighting operational, light cord in reach

## 2024-01-23 NOTE — OB PROVIDER H&P - ASSESSMENT
A/P: KEKE CHOE is a 32yo  at 40w by LMP 23 MARILU 24 presenting for term IOL.    Admission labs  Consent  GBS neg, no ppx  Cephalic, intact, /3  Cora irregularly  FHT category I  Plan for vaginal cytotec  Analgesia PRN    D/w Dr. Spencer

## 2024-01-24 LAB — T PALLIDUM AB TITR SER: NEGATIVE — SIGNIFICANT CHANGE UP

## 2024-01-24 RX ORDER — ACETAMINOPHEN 500 MG
975 TABLET ORAL EVERY 6 HOURS
Refills: 0 | Status: COMPLETED | OUTPATIENT
Start: 2024-01-24 | End: 2024-12-22

## 2024-01-24 RX ORDER — LANOLIN
1 OINTMENT (GRAM) TOPICAL EVERY 6 HOURS
Refills: 0 | Status: DISCONTINUED | OUTPATIENT
Start: 2024-01-24 | End: 2024-01-26

## 2024-01-24 RX ORDER — SODIUM CHLORIDE 9 MG/ML
3 INJECTION INTRAMUSCULAR; INTRAVENOUS; SUBCUTANEOUS EVERY 8 HOURS
Refills: 0 | Status: DISCONTINUED | OUTPATIENT
Start: 2024-01-24 | End: 2024-01-26

## 2024-01-24 RX ORDER — BENZOCAINE 10 %
1 GEL (GRAM) MUCOUS MEMBRANE EVERY 6 HOURS
Refills: 0 | Status: DISCONTINUED | OUTPATIENT
Start: 2024-01-24 | End: 2024-01-26

## 2024-01-24 RX ORDER — TETANUS TOXOID, REDUCED DIPHTHERIA TOXOID AND ACELLULAR PERTUSSIS VACCINE, ADSORBED 5; 2.5; 8; 8; 2.5 [IU]/.5ML; [IU]/.5ML; UG/.5ML; UG/.5ML; UG/.5ML
0.5 SUSPENSION INTRAMUSCULAR ONCE
Refills: 0 | Status: DISCONTINUED | OUTPATIENT
Start: 2024-01-24 | End: 2024-01-26

## 2024-01-24 RX ORDER — AER TRAVELER 0.5 G/1
1 SOLUTION RECTAL; TOPICAL EVERY 4 HOURS
Refills: 0 | Status: DISCONTINUED | OUTPATIENT
Start: 2024-01-24 | End: 2024-01-26

## 2024-01-24 RX ORDER — ACETAMINOPHEN 500 MG
1000 TABLET ORAL ONCE
Refills: 0 | Status: COMPLETED | OUTPATIENT
Start: 2024-01-24 | End: 2024-01-24

## 2024-01-24 RX ORDER — HYDROCORTISONE 1 %
1 OINTMENT (GRAM) TOPICAL EVERY 6 HOURS
Refills: 0 | Status: DISCONTINUED | OUTPATIENT
Start: 2024-01-24 | End: 2024-01-26

## 2024-01-24 RX ORDER — OXYTOCIN 10 UNIT/ML
2 VIAL (ML) INJECTION
Qty: 30 | Refills: 0 | Status: DISCONTINUED | OUTPATIENT
Start: 2024-01-24 | End: 2024-01-26

## 2024-01-24 RX ORDER — OXYCODONE HYDROCHLORIDE 5 MG/1
5 TABLET ORAL ONCE
Refills: 0 | Status: DISCONTINUED | OUTPATIENT
Start: 2024-01-24 | End: 2024-01-26

## 2024-01-24 RX ORDER — DIBUCAINE 1 %
1 OINTMENT (GRAM) RECTAL EVERY 6 HOURS
Refills: 0 | Status: DISCONTINUED | OUTPATIENT
Start: 2024-01-24 | End: 2024-01-26

## 2024-01-24 RX ORDER — OXYTOCIN 10 UNIT/ML
41.67 VIAL (ML) INJECTION
Qty: 20 | Refills: 0 | Status: DISCONTINUED | OUTPATIENT
Start: 2024-01-24 | End: 2024-01-26

## 2024-01-24 RX ORDER — SIMETHICONE 80 MG/1
80 TABLET, CHEWABLE ORAL EVERY 4 HOURS
Refills: 0 | Status: DISCONTINUED | OUTPATIENT
Start: 2024-01-24 | End: 2024-01-26

## 2024-01-24 RX ORDER — ACETAMINOPHEN 500 MG
975 TABLET ORAL EVERY 6 HOURS
Refills: 0 | Status: DISCONTINUED | OUTPATIENT
Start: 2024-01-24 | End: 2024-01-26

## 2024-01-24 RX ORDER — DIPHENHYDRAMINE HCL 50 MG
25 CAPSULE ORAL EVERY 6 HOURS
Refills: 0 | Status: DISCONTINUED | OUTPATIENT
Start: 2024-01-24 | End: 2024-01-26

## 2024-01-24 RX ORDER — MAGNESIUM HYDROXIDE 400 MG/1
30 TABLET, CHEWABLE ORAL
Refills: 0 | Status: DISCONTINUED | OUTPATIENT
Start: 2024-01-24 | End: 2024-01-26

## 2024-01-24 RX ORDER — PRAMOXINE HYDROCHLORIDE 150 MG/15G
1 AEROSOL, FOAM RECTAL EVERY 4 HOURS
Refills: 0 | Status: DISCONTINUED | OUTPATIENT
Start: 2024-01-24 | End: 2024-01-26

## 2024-01-24 RX ORDER — OXYCODONE HYDROCHLORIDE 5 MG/1
5 TABLET ORAL
Refills: 0 | Status: DISCONTINUED | OUTPATIENT
Start: 2024-01-24 | End: 2024-01-26

## 2024-01-24 RX ADMIN — Medication 125 MILLIUNIT(S)/MIN: at 14:10

## 2024-01-24 RX ADMIN — Medication 400 MILLIGRAM(S): at 14:15

## 2024-01-24 RX ADMIN — Medication 1 TABLET(S): at 21:28

## 2024-01-24 RX ADMIN — SODIUM CHLORIDE 3 MILLILITER(S): 9 INJECTION INTRAMUSCULAR; INTRAVENOUS; SUBCUTANEOUS at 22:00

## 2024-01-24 RX ADMIN — Medication 975 MILLIGRAM(S): at 22:30

## 2024-01-24 RX ADMIN — Medication 975 MILLIGRAM(S): at 21:29

## 2024-01-24 RX ADMIN — Medication 0.25 MILLIGRAM(S): at 07:22

## 2024-01-24 RX ADMIN — SODIUM CHLORIDE 125 MILLILITER(S): 9 INJECTION, SOLUTION INTRAVENOUS at 06:37

## 2024-01-24 RX ADMIN — Medication 2 MILLIUNIT(S)/MIN: at 08:28

## 2024-01-24 NOTE — OB PROVIDER DELIVERY SUMMARY - NSVACUUMDELIVERYDETAILSA _OBGYN_ALL_OB_FT
applied at +4 station since FH stayed in the 80's -90's with several pushes that would bring the head to crown but failed to deliver. One with her pushing facilitated the vertex to pop out and the rest of the infant came out quickly that the nuchal cord became a truncal cord that was released after the baby was born.

## 2024-01-24 NOTE — OB PROVIDER DELIVERY SUMMARY - NSSELHIDDEN_OBGYN_ALL_OB_FT
[NS_DeliveryAttending1_OBGYN_ALL_OB_FT:[id number]=],[NS_DeliveryRN_OBGYN_ALL_OB_FT:[id number]=],[NS_DeliveryAttending2_OBGYN_ALL_OB_FT:[id number]]

## 2024-01-24 NOTE — OB PROVIDER LABOR PROGRESS NOTE - NS_SUBJECTIVE/OBJECTIVE_OBGYN_ALL_OB_FT
OB PA Progress Note    Dr. Isidro MD1 at bedside.     Pt seen and evaluated for decel seen on EFM. Pt repositioned, IVF running. Not on IOL medication. Ctx Q1-2min, abdomen hard. FHR persistently in 80s, decision made to give terbutaline secondary to tachysystole. FHR returned to baseline, 125bpm, moderate variability.     Exam  VSS  SVE 5/80/-2, ISE placed  EFM Cat II, moderate variability, overall reassuring   [place] Q1-2min
OB PA Progress Note    Pt seen and evaluated for cervical change. Occasional late decels seen on EFM. Pitocin paused.     Exam  VSS  SVE 9/100/-1  EFM Cat II, moderate variability, overall reassuring   [place] irregular
MD 1 On service note    H and P and labs reviewed.  EFW: 8.5 pounds on my exam
Reports increased cramping abdominal pain
Resting comfortably with epidural in place

## 2024-01-24 NOTE — OB PROVIDER LABOR PROGRESS NOTE - ASSESSMENT
A/P:  - EFM Cat II, moderate variability  - Dr. Felix at bedside, aware  - Dr. Menendez in house, aware    Neva Ball PA-C
FHT category I  Cora irregularly  Vaginal cytotec #2 placed  SVE at 0500
A/P:  - s/p terb x1  - EFM Cat II, moderate variability, overall reassuring   - Dr. Isidro MD1 at bedside, aware    Neva Ball PA-C
A/P: term eIOL  cat 1 tracing now  monitor BPs  will cont to monitor      PLEE
FHT category I  S/p vaginal cytotec x2 doses  SROM at 0530. Will start pitocin.

## 2024-01-24 NOTE — OB RN DELIVERY SUMMARY - BABY A: ELECT TRANSPOND NUMBER, DELIVERY
[facility] [place]  Heart failure progress note        Yahir Means, male  : 1950  PCP: Skyla Mosher MD  Attending/Consulting Provider: Avery Barillas MD       Reason for Consultation:  No chief complaint on file.        SUBJECTIVE:     -No acute events reported overnight  -Still on pressor support with Levophed 9 mcg/min.  Remains intubated, sedated on mechanical ventilation with minimal ventilator settings 40%/+5 telemetry with sinus rhythm 98 bpm.  -Still with hypoxemic/hypercapnic respiratory failure but improving hypercapnia.  -24-hour urine output 3.4 L (-1 L net; +272 cc net since admission).  -The labs hypernatremia 146 with stable renal function and electrolytes otherwise.  CBC with downtrending leukocytosis, hemoglobin stable.      PMHx:  Past Medical History:   Diagnosis Date   • Congestive cardiac failure (CMS/HCC)    • COPD (chronic obstructive pulmonary disease) (CMS/[facility])    • Coronary artery disease involving native coronary artery of native heart without angina pectoris 2022   • Essential (primary) hypertension    • GERD (gastroesophageal reflux disease)    • High cholesterol        PSHx:  Past Surgical History:   Procedure Laterality Date   • Colonoscopy     • Egd         Family Hx:  Family History   Family history unknown: Yes       Social Hx:  Social History     Tobacco Use   • Smoking status: Current Every Day Smoker     Packs/day: 0.25     Types: Cigarettes   • Smokeless tobacco: Never Used   Substance Use Topics   • Alcohol use: Never       Allergies:  ALLERGIES:  No Known Allergies    Medications:  Prior to Admission medications    Medication Sig Start Date End Date Taking? Authorizing Provider   lactulose (CHRONULAC) 10 GM/15ML solution Take 10 g by mouth daily as needed (constipation).   Yes Outside Provider   lisinopril (ZESTRIL) 5 MG tablet Take 1 tablet by mouth daily. 2/25/22 3/27/22 Yes Nicole CARSON MD   metoPROLOL succinate (TOPROL-XL) 25 MG  24 hr tablet Take 1 tablet by mouth daily. 2/25/22 3/27/22 Yes Nicole CARSON MD   atorvastatin (LIPITOR) 40 MG tablet Take 1 tablet by mouth nightly. 2/25/22 3/27/22 Yes Nicole CARSON MD   furosemide (Lasix) 40 MG tablet Take 1 tablet by mouth daily. 2/25/22  Yes Nicole CARSON MD   aspirin 81 MG EC tablet Take 1 tablet by mouth daily. 2/25/22 3/27/22 Yes Nicole CARSON MD   albuterol 108 (90 Base) MCG/ACT inhaler Inhale 2 puffs into the lungs every 4 hours as needed for Shortness of Breath or Wheezing.    Yes Outside Provider   budesonide-formoterol (SYMBICORT) 160-4.5 MCG/ACT inhaler Inhale 2 puffs into the lungs 2 times daily.   Yes Outside Provider   albuterol (VENTOLIN) (2.5 MG/3ML) 0.083% nebulizer solution Take 2.5 mg by nebulization every 8 hours as needed for Wheezing or Shortness of Breath.    Yes Outside Provider   Calcium Carbonate Antacid (NERIS-SELTZER ANTACID PO) Take 1 tablet by mouth as needed.   Yes Outside Provider   omeprazole (PriLOSEC) 40 MG capsule Take 40 mg by mouth daily.   Yes Outside Provider       Current Facility-Administered Medications   Medication Dose Route Frequency Provider Last Rate Last Admin   • furosemide (LASIX INJECT) injection 40 mg  40 mg Intravenous Daily Danilo Salter   40 mg at 03/25/22 1718   • dextrose 50 % injection 25 g  25 g Intravenous PRN Shabbir Mackey       • dextrose 50 % injection 12.5 g  12.5 g Intravenous PRN Shabbir Mackey       • glucagon (GLUCAGEN) injection 1 mg  1 mg Intramuscular PRN Shabbir Mackey       • dextrose (GLUTOSE) 40 % gel 15 g  15 g Oral PRN Shabbir Mackey       • dextrose (GLUTOSE) 40 % gel 30 g  30 g Oral PRN Shabbir Mackey       • insulin glargine (LANTUS) injection 7 Units  7 Units Subcutaneous Daily Naty Carrion MD   7 Units at 03/25/22 0994   • aspirin chewable 81 mg  81 mg OG Tube Daily Cliff Castillo   81 mg at 03/25/22 0813   •  atorvastatin (LIPITOR) tablet 40 mg  40 mg OG Tube Nightly Cliff Castillo   40 mg at 03/25/22 2017   • insulin lispro (ADMELOG,HumaLOG) - Correction Dose   Subcutaneous 4 times per day Danilo aSlter   3 Units at 03/26/22 0022   • chlorhexidine gluconate (PERIDEX) 0.12 % solution 15 mL  15 mL Swish & Spit 2 times per day Danilo Salter   15 mL at 03/25/22 2017   • midodrine (PROAMATINE) tablet 5 mg  5 mg OG Tube TID AC Naty Carrion MD   5 mg at 03/26/22 0639   • budesonide (PULMICORT) nebulizer suspension 0.5 mg  0.5 mg Nebulization BID Resp Jazz Zhao   0.5 mg at 03/25/22 2000   • famotidine (PEPCID) injection 20 mg  20 mg Intravenous Daily Naty Carrion MD   20 mg at 03/25/22 1130   • propofol (DIPRIVAN) infusion  0-100 mcg/kg/min (Dosing Weight) Intravenous Continuous Mateusz Newton MD   Completed at 03/23/22 1228   • ipratropium-albuterol (DUONEB) 0.5-2.5 (3) MG/3ML nebulizer solution 3 mL  3 mL Nebulization Q4H Resp Farnaz Fuentes MD   3 mL at 03/26/22 0307   • methylPREDNISolone (SOLU-Medrol) PF injection 60 mg  60 mg Intravenous 4 times per day Farnaz Fuentes MD   60 mg at 03/26/22 0639   • fentaNYL (SUBLIMAZE) 2,500 mcg/250 mL in sodium chloride 0.9 % infusion  0-150 mcg/hr Intravenous Continuous Naty Carrion MD 10 mL/hr at 03/26/22 0700 100 mcg/hr at 03/26/22 0700   • NORepinephrine (LEVOPHED) 8 mg/250 mL in sodium chloride 0.9 % infusion  0-100 mcg/min Intravenous Continuous Naty Carrion MD 18.75 mL/hr at 03/26/22 0700 10 mcg/min at 03/26/22 0700   • vasopressin (PITRESSIN) 20-5 UT/100ML-% 20 unit/100 mL in dextrose 5% infusion  0.04 Units/min Intravenous Continuous Naty Carrion MD 12 mL/hr at 03/25/22 0500 0.04 Units/min at 03/25/22 0500   • sodium chloride 0.9 % flush bag 25 mL  25 mL Intravenous PRN Naty Carrion MD       • sodium chloride (PF) 0.9 % injection 2 mL  2 mL Intracatheter 2 times per day Naty Carrion MD   2 mL at 03/24/22 0908   • heparin (porcine) injection 5,000 Units  5,000 Units  Subcutaneous 3 times per day Naty Carrion MD   5,000 Units at 03/26/22 0639   • PHENYLephrine (ARTUR-SYNEPHRINE) 50 mg/250 mL in sodium chloride 0.9 % infusion  0-300 mcg/min Intravenous Continuous Naty Carrion MD   Completed at 03/23/22 1554   • sodium chloride 0.9 % flush bag 25 mL  25 mL Intravenous PRN Naty Carrion MD       • Potassium Standard Replacement Protocol   Does not apply See Admin Instructions Naty Carrion MD       • Phosphorus Standard Replacement Protocol   Does not apply See Admin Instructions Naty Carrion MD       • Magnesium Standard Replacement Protocol   Does not apply See Admin Instructions Naty Carrion MD       • MIDAZolam (VERSED) injection 4 mg  4 mg Intravenous Once PRN Shabbir Mackey       • ondansetron (ZOFRAN) injection 4 mg  4 mg Intravenous Q12H PRN Cliff Castillo       • acetaminophen (TYLENOL) tablet 650 mg  650 mg Oral Q4H PRN Cliff SCOTT Catgustavo        Or   • acetaminophen (TYLENOL) suppository 650 mg  650 mg Rectal Q4H PRN Cliff SCOTT Catmarci           Review of Systems:  ROS   Unable to be obtained      OBJECTIVE:     Vital Last Value 24 Hour Range   Temperature 98.7 °F (37.1 °C) (03/26/22 0400) Temp  Min: 98.4 °F (36.9 °C)  Max: 98.8 °F (37.1 °C)   Pulse 93 (03/26/22 0700) Pulse  Min: 91  Max: 123   Respiratory 16 (03/26/22 0700) Resp  Min: 11  Max: 23   Non-Invasive  Blood Pressure (!) 167/78 (03/26/22 0000) BP  Min: 119/81  Max: 167/78   Pulse Oximetry 98 % (03/26/22 0700) SpO2  Min: 96 %  Max: 99 %   Arterial   Blood Pressure   No data recorded        Intake/Output Summary (Last 24 hours) at 3/26/2022 0729  Last data filed at 3/26/2022 0700  Gross per 24 hour   Intake 2130.24 ml   Output 3435 ml   Net -1304.76 ml        Wt Readings from Last 3 Encounters:   03/22/22 68.5 kg (151 lb 0.2 oz)   02/24/22 70.8 kg (156 lb 1.4 oz)   02/14/22 73 kg (161 lb)       CVP:  [0 mmHg-11 mmHg] 0 mmHg     Tele: SVT, sinus tach    PHYSICAL EXAMINATION:  Physical Exam   General: Still on pressor  support with Levophed 9 mcg/min.  Remains intubated, sedated on mechanical ventilation with minimal ventilator settings 40%/+5 telemetry with sinus rhythm 98 bpm. (MAP 84 while on levophed)  HEENT: Normocephalic, atraumatic, perrla, eomi   Neck: No JVD, mobile, supple  Heart: S1/S2 present, no MRG.   Lungs: CTA BL  LE: Warm, dry without peripheral edema.  Strong pulses 2+ in all 4 extremities.  Neuro: Weaning off sedation, he is awake, reactive, follows commands.  Looks calm even still intubated.      DIAGNOSTIC STUDIES:     Labs:  CBC:   Recent Labs   Lab 03/26/22  0400 03/25/22  0409 03/24/22  0517 03/23/22  1413 03/23/22  0933 03/22/22  1144 02/25/22  0441 02/24/22  1447   WBC 12.8* 14.1* 11.3* 17.1* 17.1*   < > 5.3 9.0   RBC 4.44* 3.94* 4.55 4.67 5.24   < > 4.92 5.39   HGB 13.9 12.2* 13.9 14.6 16.3   < > 15.1 16.7   HCT 42.5 37.5* 42.7 43.8 50.0   < > 45.4 51.7*   MCV 95.7 95.2 93.8 93.8 95.4   < > 92.3 95.9   MCHC 32.7 32.5 32.6 33.3 32.6   < > 33.3 32.3   RDW-CV 13.3 13.4 13.3 13.5 13.5   < > 13.5 13.6    174 236 229 236   < > 212 261   Lymphocytes, Percent  --   --   --  3 6  --  14 47    < > = values in this interval not displayed.     CMP:  Recent Labs   Lab 03/26/22  0400 03/25/22  0409 03/24/22  0517 03/23/22  1256 03/23/22  0933   SODIUM 146* 143 141 143 141   POTASSIUM 4.7 5.2* 5.0 4.4 4.9   CHLORIDE 103 107 105 107 104   CO2 41* 33* 27 25 24   BUN 24* 25* 25* 21* 17   CREATININE 0.92 1.01 1.26* 1.45* 1.39*   GLUCOSE 182* 246* 235* 156* 143*   MG 2.5*  --   --  1.8 2.3     COAGULATION STUDIES:   Recent Labs   Lab 03/23/22  1413   PT 11.4   PTT 28   INR 1.1     TSH:  No results found for: TSH  HbA1c:   Hemoglobin A1C (%)   Date Value   02/25/2022 6.5 (H)     LIPID PANEL: No results found  NT-PRONBP:   Recent Labs   Lab 03/23/22  1256 02/24/22  1447   NT-proBNP 2,660* 1,040*     Troponin: No results found    Data:  Encounter Date: 03/22/22   Electrocardiogram 12-Lead   Result Value    Ventricular  Rate EKG/Min (BPM) 62    Atrial Rate (BPM) 57    QRS-Interval (MSEC) 150    QT-Interval (MSEC) 516    QTc 524    R Axis (Degrees) -86    T Axis (Degrees) 101    REPORT TEXT      Sinus rhythm with VA dissociation  due to idioventricular rhythm  Left axis deviation  Right bundle branch block  Marked T wave abnormality, consider lateral ischemia  Abnormal ECG  Confirmed by BRITTANI DURON MD ([id number]) on 3/24/2022 8:12:01 AM       2D TTE 03/24/2022:  1. Left ventricle: The cavity size is normal. Wall thickness is normal.     The ejection fraction was measured by 3D assessment. Severe global     hypokinesis with regional variations. Akinesis of the     entireinferolateral myocardium. Akinesis of the basal-midinferior     myocardium. Moderate hypokinesis of the entireanteroseptal and     inferoseptal myocardium. There is no evidence of a thrombus revealed by     acoustic contrast opacification. The ejection fraction is 15%.  2. Right ventricle: Systolic function is normal by visual assessment.  3. Atrial septum: Color doppler shows no obvious shunt.  4. Inferior vena cava: The vessel is dilated. The respirophasic diameter     changes are blunted (less than 50%).  Impressions:   The study shows worsening since the study of 02/25/2022.  Ef appears mildly worse compared to the prior study. There is no clear ASD  noted on color Doppler. Consider bubble study if there is concern for  This.    Coronary angiogram 03/22/2022:  Left Main   The vessel is greater than 4 mm.   Dist LM lesion with 40% stenosis. Lesion length: discrete (<10 mm).   Left Anterior Descending   The vessel is 3.5-4 mm.   Ost LAD lesion with 40% stenosis. Lesion length: discrete (<10 mm).   Mid LAD lesion with 85% stenosis. Lesion length: tubular (10-20 mm).   Ramus Intermedius   The vessel is 2-2.5 mm.   Ramus lesion with 65% stenosis. Lesion length: tubular (10-20 mm).   Left Circumflex   The vessel is 2-2.5 mm.   Dist Cx lesion with 75% stenosis. Lesion  length: tubular (10-20 mm).   Right Coronary Artery   Ost RCA lesion with 100% stenosis. The vessel is total chronically occluded.           ASSESSMENT AND PLAN:     Impression:   Mr. Means is a 70yo M with a PMHx of hypertension, hyperlipidemia, COPD, ischemic cardiomyopathy, heart failure with reduced ejection fraction per 2D TTE 15% NYHA III ACC AHA stage C, ischemic cardiomyopathy, CAD ( RCA proximal, complex, mid LAD) who is currently in the ICU in mixed shock and hypercarbic respiratory failure after a diagnostic angiogram.    Assessment:   #.  Shock multifactorial   #.  Sinus tachycardia  #.  Paroxysmal AVNRT  #.  Acute on chronic hypercarbic respiratory failure  #.  HFrEF 15% NYHA III ACC/AHA C  #.  Ischemic cardiomyopathy   #.  CAD  RCA proximal, complex, mid LAD  #.  HTN  #.  HLD    Plan:  -From cardiology standpoint okay to extubate as per ICU team.  We will plan for new possible high risk PCI next week with CABG if severe left main disease.  -Continue Lasix 40mg IV daily, goal is to keep negative.  At this moment not in CHF  -Continue aspirin 81mg po daily, Lipitor 80mg po daily.  -Agree with midodrine  -Okay to start Toprol XL once hemodynamically stable off pressors  -Respiratory failure management per primary team.   evaulation.   Strict I/Os  Daily weights  Please keep K >4 and mag >2.     I will discuss with Dr. Andi Carlos MD  Cardiovascular fellow (PGY-IV)     [id number]

## 2024-01-24 NOTE — OB PROVIDER LABOR PROGRESS NOTE - NS_OBIHIFHRDETAILS_OBGYN_ALL_OB_FT
130s mod jose + accels, no decels  s/p terbutaline for tachysystole
120bpm mod variability +accels -decels
115bpm, mod variability +accels -decels

## 2024-01-24 NOTE — OB PROVIDER DELIVERY SUMMARY - NSPROVIDERDELIVERYNOTE_OBGYN_ALL_OB_FT
Mighty vac vacuum applied to the fetal vertex for prolonged bradycardia  during several attempts at endsstage pushing  without successful delivery. Pressure to the green mard pumped up and vacuum was pulled on as mom pushed and with 1 pull facilitated delivery of the fetal vertex with a loose nuchal cord x1. delayed cord clamping was donen since baby cried immediately  and once the baby was detached, the baby was brought to the neonatologist and Apgars of 9/9 were given. Cord bloods were obtained and the placenta delivered spontaneously intact and the vagina inspected and a 2nd degree laceration noted and rep[aired with 2-0 chromic. EBL = 250. Fundus checked and rremained fired and bleeding was minimal.

## 2024-01-24 NOTE — OB RN DELIVERY SUMMARY - NSSELHIDDEN_OBGYN_ALL_OB_FT
[NS_DeliveryAttending1_OBGYN_ALL_OB_FT:[id number]=],[NS_DeliveryRN_OBGYN_ALL_OB_FT:[id number]=]

## 2024-01-24 NOTE — OB RN DELIVERY SUMMARY - NS_SEPSISRSKCALC_OBGYN_ALL_OB_FT
EOS calculated successfully. EOS Risk Factor: 0.5/1000 live births ([facility] national incidence); GA=40w2d; Temp=98.8; ROM=9.183; GBS='Negative'; Antibiotics='No antibiotics or any antibiotics < 2 hrs prior to birth'

## 2024-01-25 ENCOUNTER — TRANSCRIPTION ENCOUNTER (OUTPATIENT)
Age: 34
End: 2024-01-25

## 2024-01-25 LAB
HCT VFR BLD CALC: 35.3 % — SIGNIFICANT CHANGE UP (ref 34.5–45)
HGB BLD-MCNC: 11.8 G/DL — SIGNIFICANT CHANGE UP (ref 11.5–15.5)

## 2024-01-25 RX ORDER — ACETAMINOPHEN 500 MG
3 TABLET ORAL
Qty: 0 | Refills: 0 | DISCHARGE
Start: 2024-01-25

## 2024-01-25 RX ADMIN — Medication 975 MILLIGRAM(S): at 08:36

## 2024-01-25 RX ADMIN — Medication 975 MILLIGRAM(S): at 03:00

## 2024-01-25 RX ADMIN — Medication 975 MILLIGRAM(S): at 20:37

## 2024-01-25 RX ADMIN — Medication 975 MILLIGRAM(S): at 10:07

## 2024-01-25 RX ADMIN — Medication 975 MILLIGRAM(S): at 04:00

## 2024-01-25 RX ADMIN — SODIUM CHLORIDE 3 MILLILITER(S): 9 INJECTION INTRAMUSCULAR; INTRAVENOUS; SUBCUTANEOUS at 06:29

## 2024-01-25 NOTE — DISCHARGE NOTE OB - MATERIALS PROVIDED
Vaccinations/[facility]  Screening Program/  Immunization Record/Breastfeeding Log/Breastfeeding Mother’s Support Group Information/Guide to Postpartum Care/[facility] Hearing Screen Program/Back To Sleep Handout/Shaken Baby Prevention Handout/Breastfeeding Guide and Packet

## 2024-01-25 NOTE — DISCHARGE NOTE OB - MEDICATION SUMMARY - MEDICATIONS TO TAKE
I will START or STAY ON the medications listed below when I get home from the hospital:    acetaminophen 325 mg oral tablet  -- 3 tab(s) by mouth every 6 hours  -- Indication: For pain    Lexapro 20 mg oral tablet  -- 1 tab(s) by mouth once a day  -- Indication: For home med    pantoprazole 40 mg oral delayed release tablet  -- 1 tab(s) by mouth once a day  -- Indication: For home med

## 2024-01-25 NOTE — DISCHARGE NOTE OB - PATIENT PORTAL LINK FT
You can access the FollowMyHealth Patient Portal offered by [facility] by registering at the following website: [email or web address]. By joining [facility]’s FollowMyHealth portal, you will also be able to view your health information using other applications (apps) compatible with our system.

## 2024-01-25 NOTE — DISCHARGE NOTE OB - MEDICATION SUMMARY - MEDICATIONS TO CHANGE
Support provided to patient and family. Patient to have access to supportive services during rest of hospital stay as the patient/family deemed necessary ie. Chaplaincy, Massage therapy, Music therapy, Patient and family supportive services, Palliative SW, etc. as identified during the patients PSSA screening the patient would benefit from: continued visits from Patient and Family Support counselor and massage therapy. I will SWITCH the dose or number of times a day I take the medications listed below when I get home from the hospital:  None

## 2024-01-25 NOTE — DISCHARGE NOTE OB - CARE PLAN
Principal Discharge DX:	 (normal spontaneous vaginal delivery)  Assessment and plan of treatment:	- Please call your provider to schedule a postpartum visit in 4-6 weeks. Contact information provided below.  - Prescriptions have been sent to pharmacy. Please take medications as directed.   - Patient is to continue with regular diet and activity as tolerated, nothing per vagina for 6 weeks, and exclusive breast feeding for the first 6 months is recommended.   - If you have additional concerns, or if you experience fevers > 100.4 F or heavy vaginal bleeding that is not decreasing, please inform your provider.   1

## 2024-01-25 NOTE — DISCHARGE NOTE OB - NS MD DC FALL RISK RISK
For information on Fall & Injury Prevention, visit: [email or web address] OR  [email or web address] OR  https://www.cdc.gov/steadi/patient.html

## 2024-01-25 NOTE — DISCHARGE NOTE OB - CARE PROVIDER_API CALL
Luis Alberto Perez  Obstetrics and Gynecology  [address] [place] [id number]  Phone: [phone]  Fax: [phone]  Follow Up Time: 1 month

## 2024-01-26 VITALS
OXYGEN SATURATION: 99 % | HEART RATE: 65 BPM | RESPIRATION RATE: 17 BRPM | DIASTOLIC BLOOD PRESSURE: 58 MMHG | TEMPERATURE: 98 F | SYSTOLIC BLOOD PRESSURE: 118 MMHG

## 2024-01-26 RX ADMIN — Medication 1 TABLET(S): at 09:26

## 2024-01-26 RX ADMIN — Medication 975 MILLIGRAM(S): at 03:56

## 2024-01-26 RX ADMIN — Medication 975 MILLIGRAM(S): at 09:26

## 2024-01-26 NOTE — PROGRESS NOTE ADULT - SUBJECTIVE AND OBJECTIVE BOX
Patient seen and positioned on her left side due to some late decelerations which is now resolved. Variability is good with acceleraations. Pitocin has been left off and ccontractions continue every 3-4 minutes on their own < SROM occurred around 4 am. Last exam was around 7:30 and she was 5-6/80/ -2. Will defer reexam due to rupture membranes and no rectal pressure sensation. Plan restart pitocin if tracing allows.
S: Patient doing well. Minimal lochia. No complaints.    O: Vital Signs Last 24 Hrs  T(C): 36.8 (2024 08:42), Max: 36.9 (2024 13:00)  T(F): 98.2 (2024 08:42), Max: 98.4 (2024 13:00)  HR: 73 (2024 08:42) (60 - 91)  BP: 117/66 (2024 08:42) (108/60 - 132/70)  BP(mean): --  RR: 16 (2024 08:42) (16 - 17)  SpO2: 99% (2024 08:42) (98% - 99%)    Parameters below as of 2024 08:42  Patient On (Oxygen Delivery Method): room air        Gen: NAD  Abd: soft, NT, ND, fundus firm below umbilicus  Ext: no tenderness    Labs:                        11.8   x     )-----------( x        ( 2024 08:36 )             35.3      Blood type O+  Rubella status: immune    A: 33y PPD# s/p  boy    Doing well    Plan:  [x ] Routine post partum care.x[ x] Discharge home in AM.  [ x] Circumcision today.  
S: Patient doing well. Minimal lochia. No complaints. Eager to go home    O: Vital Signs Last 24 Hrs  T(C): 36.5 (2024 08:49), Max: 36.9 (2024 19:47)  T(F): 97.7 (2024 08:49), Max: 98.5 (2024 19:47)  HR: 65 (2024 08:49) (65 - 67)  BP: 118/58 (2024 08:49) (113/72 - 118/58)  BP(mean): --  RR: 17 (2024 08:49) (17 - 17)  SpO2: 99% (2024 08:49) (97% - 99%)    Parameters below as of 2024 08:49  Patient On (Oxygen Delivery Method): room air        Gen: NAD  Abd: soft, NT, ND, fundus firm below umbilicus  Ext: no tenderness    Labs:                        11.8   x     )-----------( x        ( 2024 08:36 )             35.3        Rubella status: immune  blood type; B+     A: 33y PPD# 2 s/p      Doing well    Plan:  [ ] Discharge home.  Nothing in vagina and no heavy lifting for 6 weeks.   Follow up 6 weeks for post partum visit.  Call office for any fevers, chills, heavy vaginal bleeding, symptoms of depression, or any other concerning symptoms.  Continue motrin 600 mg every 6 hours.

## 2024-01-29 DIAGNOSIS — Z28.09 IMMUNIZATION NOT CARRIED OUT BECAUSE OF OTHER CONTRAINDICATION: ICD-10-CM

## 2024-01-29 DIAGNOSIS — F41.9 ANXIETY DISORDER, UNSPECIFIED: ICD-10-CM

## 2024-01-29 DIAGNOSIS — Z88.8 ALLERGY STATUS TO OTHER DRUGS, MEDICAMENTS AND BIOLOGICAL SUBSTANCES: ICD-10-CM

## 2024-01-29 DIAGNOSIS — O48.0 POST-TERM PREGNANCY: ICD-10-CM

## 2024-01-29 DIAGNOSIS — Z28.21 IMMUNIZATION NOT CARRIED OUT BECAUSE OF PATIENT REFUSAL: ICD-10-CM

## 2024-01-29 DIAGNOSIS — Z3A.40 40 WEEKS GESTATION OF PREGNANCY: ICD-10-CM

## 2024-01-29 DIAGNOSIS — Z90.49 ACQUIRED ABSENCE OF OTHER SPECIFIED PARTS OF DIGESTIVE TRACT: ICD-10-CM

## 2024-06-14 NOTE — ED PROVIDER NOTE - NSCAREINITIATED _GEN_ER
Rachel / [facility] called    Referral needed for patient to see Dr Hilario Murphy on June 24 - for his left shoulder  Fax # for Dr Murphy's office [phone]     Valentín Diaz(Attending)

## 2024-12-24 ENCOUNTER — OUTPATIENT (OUTPATIENT)
Dept: OUTPATIENT SERVICES | Facility: HOSPITAL | Age: 34
LOS: 1 days | Discharge: HOME | End: 2024-12-24

## 2024-12-24 DIAGNOSIS — D50.9 IRON DEFICIENCY ANEMIA, UNSPECIFIED: ICD-10-CM

## 2024-12-24 DIAGNOSIS — D51.9 VITAMIN B12 DEFICIENCY ANEMIA, UNSPECIFIED: ICD-10-CM

## 2024-12-24 DIAGNOSIS — Z90.3 ACQUIRED ABSENCE OF STOMACH [PART OF]: Chronic | ICD-10-CM

## 2024-12-26 ENCOUNTER — RESULT REVIEW (OUTPATIENT)
Age: 34
End: 2024-12-26

## 2024-12-26 ENCOUNTER — APPOINTMENT (OUTPATIENT)
Dept: HEMATOLOGY ONCOLOGY | Facility: CLINIC | Age: 34
End: 2024-12-26

## 2024-12-26 LAB
BASOPHILS # BLD AUTO: 0.03 K/UL — SIGNIFICANT CHANGE UP (ref 0–0.2)
BASOPHILS NFR BLD AUTO: 0.6 % — SIGNIFICANT CHANGE UP (ref 0–2)
EOSINOPHIL # BLD AUTO: 0.05 K/UL — SIGNIFICANT CHANGE UP (ref 0–0.5)
EOSINOPHIL NFR BLD AUTO: 1 % — SIGNIFICANT CHANGE UP (ref 0–6)
HCT VFR BLD CALC: 38.8 % — SIGNIFICANT CHANGE UP (ref 34.5–45)
HGB BLD-MCNC: 12.7 G/DL — SIGNIFICANT CHANGE UP (ref 11.5–15.5)
IMM GRANULOCYTES NFR BLD AUTO: 0.2 % — SIGNIFICANT CHANGE UP (ref 0–0.9)
LYMPHOCYTES # BLD AUTO: 1.52 K/UL — SIGNIFICANT CHANGE UP (ref 1–3.3)
LYMPHOCYTES # BLD AUTO: 29.1 % — SIGNIFICANT CHANGE UP (ref 13–44)
MCHC RBC-ENTMCNC: 29.7 PG — SIGNIFICANT CHANGE UP (ref 27–34)
MCHC RBC-ENTMCNC: 32.7 G/DL — SIGNIFICANT CHANGE UP (ref 32–36)
MCV RBC AUTO: 90.9 FL — SIGNIFICANT CHANGE UP (ref 80–100)
MONOCYTES # BLD AUTO: 0.35 K/UL — SIGNIFICANT CHANGE UP (ref 0–0.9)
MONOCYTES NFR BLD AUTO: 6.7 % — SIGNIFICANT CHANGE UP (ref 2–14)
NEUTROPHILS # BLD AUTO: 3.27 K/UL — SIGNIFICANT CHANGE UP (ref 1.8–7.4)
NEUTROPHILS NFR BLD AUTO: 62.4 % — SIGNIFICANT CHANGE UP (ref 43–77)
NRBC # BLD: 0 /100 WBCS — SIGNIFICANT CHANGE UP (ref 0–0)
NRBC BLD-RTO: 0 /100 WBCS — SIGNIFICANT CHANGE UP (ref 0–0)
PLATELET # BLD AUTO: 240 K/UL — SIGNIFICANT CHANGE UP (ref 150–400)
RBC # BLD: 4.27 M/UL — SIGNIFICANT CHANGE UP (ref 3.8–5.2)
RBC # FLD: 13.2 % — SIGNIFICANT CHANGE UP (ref 10.3–14.5)
WBC # BLD: 5.23 K/UL — SIGNIFICANT CHANGE UP (ref 3.8–10.5)
WBC # FLD AUTO: 5.23 K/UL — SIGNIFICANT CHANGE UP (ref 3.8–10.5)

## 2024-12-31 ENCOUNTER — NON-APPOINTMENT (OUTPATIENT)
Age: 34
End: 2024-12-31

## 2024-12-31 LAB
ERYTHROCYTE [SEDIMENTATION RATE] IN BLOOD BY WESTERGREN METHOD: 28 MM/HR
FERRITIN SERPL-MCNC: 41 NG/ML
FOLATE SERPL-MCNC: 8 NG/ML
IRON SATN MFR SERPL: 21 %
IRON SERPL-MCNC: 38 UG/DL
TIBC SERPL-MCNC: 187 UG/DL
UIBC SERPL-MCNC: 148 UG/DL
VIT B12 SERPL-MCNC: 551 PG/ML

## 2025-01-08 PROBLEM — D56.9 MEDITERRANEAN ANEMIA: Status: RESOLVED | Noted: 2022-12-28 | Resolved: 2025-01-08

## 2025-01-08 PROBLEM — Z86.2 HISTORY OF THALASSEMIA: Status: RESOLVED | Noted: 2023-07-25 | Resolved: 2025-01-08

## 2025-01-09 ENCOUNTER — APPOINTMENT (OUTPATIENT)
Dept: HEMATOLOGY ONCOLOGY | Facility: CLINIC | Age: 35
End: 2025-01-09
Payer: COMMERCIAL

## 2025-01-09 ENCOUNTER — NON-APPOINTMENT (OUTPATIENT)
Age: 35
End: 2025-01-09

## 2025-01-09 VITALS
SYSTOLIC BLOOD PRESSURE: 98 MMHG | OXYGEN SATURATION: 98 % | TEMPERATURE: 98.3 F | WEIGHT: 216.56 LBS | DIASTOLIC BLOOD PRESSURE: 64 MMHG | BODY MASS INDEX: 36.97 KG/M2 | HEIGHT: 64 IN | HEART RATE: 88 BPM

## 2025-01-09 DIAGNOSIS — R79.89 OTHER SPECIFIED ABNORMAL FINDINGS OF BLOOD CHEMISTRY: ICD-10-CM

## 2025-01-09 DIAGNOSIS — Z86.2 PERSONAL HISTORY OF DISEASES OF THE BLOOD AND BLOOD-FORMING ORGANS AND CERTAIN DISORDERS INVOLVING THE IMMUNE MECHANISM: ICD-10-CM

## 2025-01-09 DIAGNOSIS — O99.212 OBESITY COMPLICATING PREGNANCY, SECOND TRIMESTER: ICD-10-CM

## 2025-01-09 DIAGNOSIS — E66.9 OBESITY, UNSPECIFIED: ICD-10-CM

## 2025-01-09 DIAGNOSIS — O99.840 BARIATRIC SURGERY STATUS COMPLICATING PREGNANCY, UNSPECIFIED TRIMESTER: ICD-10-CM

## 2025-01-09 DIAGNOSIS — D50.9 IRON DEFICIENCY ANEMIA, UNSPECIFIED: ICD-10-CM

## 2025-01-09 DIAGNOSIS — Z90.3 POSTSURGICAL MALABSORPTION, NOT ELSEWHERE CLASSIFIED: ICD-10-CM

## 2025-01-09 DIAGNOSIS — D56.9 THALASSEMIA, UNSPECIFIED: ICD-10-CM

## 2025-01-09 DIAGNOSIS — Z87.42 PERSONAL HISTORY OF OTHER DISEASES OF THE FEMALE GENITAL TRACT: ICD-10-CM

## 2025-01-09 DIAGNOSIS — K91.2 POSTSURGICAL MALABSORPTION, NOT ELSEWHERE CLASSIFIED: ICD-10-CM

## 2025-01-09 PROCEDURE — 99213 OFFICE O/P EST LOW 20 MIN: CPT

## 2025-01-09 PROCEDURE — G2211 COMPLEX E/M VISIT ADD ON: CPT

## 2025-01-09 RX ORDER — TIRZEPATIDE 15 MG/.5ML
15 INJECTION, SOLUTION SUBCUTANEOUS
Refills: 0 | Status: ACTIVE | COMMUNITY

## 2025-01-09 RX ORDER — DEXLANSOPRAZOLE 60 MG/1
CAPSULE, DELAYED RELEASE ORAL
Refills: 0 | Status: ACTIVE | COMMUNITY

## 2025-01-10 DIAGNOSIS — K91.2 POSTSURGICAL MALABSORPTION, NOT ELSEWHERE CLASSIFIED: ICD-10-CM

## 2025-01-16 ENCOUNTER — APPOINTMENT (OUTPATIENT)
Dept: INFUSION THERAPY | Facility: CLINIC | Age: 35
End: 2025-01-16

## 2025-01-16 VITALS
HEIGHT: 64 IN | BODY MASS INDEX: 36.54 KG/M2 | HEART RATE: 77 BPM | OXYGEN SATURATION: 100 % | SYSTOLIC BLOOD PRESSURE: 103 MMHG | WEIGHT: 214 LBS | DIASTOLIC BLOOD PRESSURE: 69 MMHG | TEMPERATURE: 98.3 F

## 2025-01-23 ENCOUNTER — APPOINTMENT (OUTPATIENT)
Dept: INFUSION THERAPY | Facility: CLINIC | Age: 35
End: 2025-01-23

## 2025-02-13 ENCOUNTER — RESULT REVIEW (OUTPATIENT)
Age: 35
End: 2025-02-13

## 2025-02-13 ENCOUNTER — APPOINTMENT (OUTPATIENT)
Dept: HEMATOLOGY ONCOLOGY | Facility: CLINIC | Age: 35
End: 2025-02-13

## 2025-02-13 LAB
BASOPHILS # BLD AUTO: 0.02 K/UL — SIGNIFICANT CHANGE UP (ref 0–0.2)
BASOPHILS NFR BLD AUTO: 0.4 % — SIGNIFICANT CHANGE UP (ref 0–2)
EOSINOPHIL # BLD AUTO: 0.05 K/UL — SIGNIFICANT CHANGE UP (ref 0–0.5)
EOSINOPHIL NFR BLD AUTO: 1.1 % — SIGNIFICANT CHANGE UP (ref 0–6)
HCT VFR BLD CALC: 40 % — SIGNIFICANT CHANGE UP (ref 34.5–45)
HGB BLD-MCNC: 13.4 G/DL — SIGNIFICANT CHANGE UP (ref 11.5–15.5)
IMM GRANULOCYTES NFR BLD AUTO: 0.2 % — SIGNIFICANT CHANGE UP (ref 0–0.9)
LYMPHOCYTES # BLD AUTO: 1.3 K/UL — SIGNIFICANT CHANGE UP (ref 1–3.3)
LYMPHOCYTES # BLD AUTO: 27.8 % — SIGNIFICANT CHANGE UP (ref 13–44)
MCHC RBC-ENTMCNC: 30.4 PG — SIGNIFICANT CHANGE UP (ref 27–34)
MCHC RBC-ENTMCNC: 33.5 G/DL — SIGNIFICANT CHANGE UP (ref 32–36)
MCV RBC AUTO: 90.7 FL — SIGNIFICANT CHANGE UP (ref 80–100)
MONOCYTES # BLD AUTO: 0.42 K/UL — SIGNIFICANT CHANGE UP (ref 0–0.9)
MONOCYTES NFR BLD AUTO: 9 % — SIGNIFICANT CHANGE UP (ref 2–14)
NEUTROPHILS # BLD AUTO: 2.88 K/UL — SIGNIFICANT CHANGE UP (ref 1.8–7.4)
NEUTROPHILS NFR BLD AUTO: 61.5 % — SIGNIFICANT CHANGE UP (ref 43–77)
NRBC BLD AUTO-RTO: 0 /100 WBCS — SIGNIFICANT CHANGE UP (ref 0–0)
PLATELET # BLD AUTO: 227 K/UL — SIGNIFICANT CHANGE UP (ref 150–400)
RBC # BLD: 4.41 M/UL — SIGNIFICANT CHANGE UP (ref 3.8–5.2)
RBC # FLD: 14 % — SIGNIFICANT CHANGE UP (ref 10.3–14.5)
WBC # BLD: 4.68 K/UL — SIGNIFICANT CHANGE UP (ref 3.8–10.5)
WBC # FLD AUTO: 4.68 K/UL — SIGNIFICANT CHANGE UP (ref 3.8–10.5)

## 2025-02-14 LAB
ERYTHROCYTE [SEDIMENTATION RATE] IN BLOOD BY WESTERGREN METHOD: 47 MM/HR
FERRITIN SERPL-MCNC: 106 NG/ML
FOLATE SERPL-MCNC: 7.6 NG/ML
IRON SATN MFR SERPL: 16 %
IRON SERPL-MCNC: 25 UG/DL
TIBC SERPL-MCNC: 155 UG/DL
UIBC SERPL-MCNC: 129 UG/DL
VIT B12 SERPL-MCNC: 497 PG/ML

## 2025-02-27 ENCOUNTER — OUTPATIENT (OUTPATIENT)
Dept: OUTPATIENT SERVICES | Facility: HOSPITAL | Age: 35
LOS: 1 days | Discharge: HOME | End: 2025-02-27

## 2025-02-27 DIAGNOSIS — D50.9 IRON DEFICIENCY ANEMIA, UNSPECIFIED: ICD-10-CM

## 2025-02-27 DIAGNOSIS — Z90.3 ACQUIRED ABSENCE OF STOMACH [PART OF]: Chronic | ICD-10-CM

## 2025-02-27 DIAGNOSIS — D51.9 VITAMIN B12 DEFICIENCY ANEMIA, UNSPECIFIED: ICD-10-CM

## 2025-02-27 DIAGNOSIS — K91.2 POSTSURGICAL MALABSORPTION, NOT ELSEWHERE CLASSIFIED: ICD-10-CM

## 2025-03-18 ENCOUNTER — APPOINTMENT (OUTPATIENT)
Dept: INFUSION THERAPY | Facility: CLINIC | Age: 35
End: 2025-03-18

## 2025-03-18 VITALS
TEMPERATURE: 98.3 F | OXYGEN SATURATION: 99 % | WEIGHT: 201 LBS | HEIGHT: 64 IN | DIASTOLIC BLOOD PRESSURE: 71 MMHG | SYSTOLIC BLOOD PRESSURE: 105 MMHG | BODY MASS INDEX: 34.31 KG/M2 | HEART RATE: 81 BPM

## 2025-04-16 ENCOUNTER — APPOINTMENT (OUTPATIENT)
Dept: HEMATOLOGY ONCOLOGY | Facility: CLINIC | Age: 35
End: 2025-04-16

## 2025-04-16 ENCOUNTER — RESULT REVIEW (OUTPATIENT)
Age: 35
End: 2025-04-16

## 2025-04-16 LAB
BASOPHILS # BLD AUTO: 0.04 K/UL — SIGNIFICANT CHANGE UP (ref 0–0.2)
BASOPHILS NFR BLD AUTO: 0.8 % — SIGNIFICANT CHANGE UP (ref 0–2)
EOSINOPHIL # BLD AUTO: 0.06 K/UL — SIGNIFICANT CHANGE UP (ref 0–0.5)
EOSINOPHIL NFR BLD AUTO: 1.2 % — SIGNIFICANT CHANGE UP (ref 0–6)
HCT VFR BLD CALC: 40.8 % — SIGNIFICANT CHANGE UP (ref 34.5–45)
HGB BLD-MCNC: 13.7 G/DL — SIGNIFICANT CHANGE UP (ref 11.5–15.5)
IMM GRANULOCYTES NFR BLD AUTO: 0.4 % — SIGNIFICANT CHANGE UP (ref 0–0.9)
LYMPHOCYTES # BLD AUTO: 1.55 K/UL — SIGNIFICANT CHANGE UP (ref 1–3.3)
LYMPHOCYTES # BLD AUTO: 31.1 % — SIGNIFICANT CHANGE UP (ref 13–44)
MCHC RBC-ENTMCNC: 31.4 PG — SIGNIFICANT CHANGE UP (ref 27–34)
MCHC RBC-ENTMCNC: 33.6 G/DL — SIGNIFICANT CHANGE UP (ref 32–36)
MCV RBC AUTO: 93.6 FL — SIGNIFICANT CHANGE UP (ref 80–100)
MONOCYTES # BLD AUTO: 0.38 K/UL — SIGNIFICANT CHANGE UP (ref 0–0.9)
MONOCYTES NFR BLD AUTO: 7.6 % — SIGNIFICANT CHANGE UP (ref 2–14)
NEUTROPHILS # BLD AUTO: 2.94 K/UL — SIGNIFICANT CHANGE UP (ref 1.8–7.4)
NEUTROPHILS NFR BLD AUTO: 58.9 % — SIGNIFICANT CHANGE UP (ref 43–77)
NRBC BLD AUTO-RTO: 0 /100 WBCS — SIGNIFICANT CHANGE UP (ref 0–0)
PLATELET # BLD AUTO: 219 K/UL — SIGNIFICANT CHANGE UP (ref 150–400)
RBC # BLD: 4.36 M/UL — SIGNIFICANT CHANGE UP (ref 3.8–5.2)
RBC # FLD: 13.5 % — SIGNIFICANT CHANGE UP (ref 10.3–14.5)
WBC # BLD: 4.99 K/UL — SIGNIFICANT CHANGE UP (ref 3.8–10.5)
WBC # FLD AUTO: 4.99 K/UL — SIGNIFICANT CHANGE UP (ref 3.8–10.5)

## 2025-04-18 LAB
ERYTHROCYTE [SEDIMENTATION RATE] IN BLOOD BY WESTERGREN METHOD: 27 MM/HR
FERRITIN SERPL-MCNC: 132 NG/ML
IRON SATN MFR SERPL: 23 %
IRON SERPL-MCNC: 41 UG/DL
TIBC SERPL-MCNC: 177 UG/DL
UIBC SERPL-MCNC: 136 UG/DL

## 2025-08-04 ENCOUNTER — APPOINTMENT (OUTPATIENT)
Dept: ANTEPARTUM | Facility: CLINIC | Age: 35
End: 2025-08-04
Payer: COMMERCIAL

## 2025-08-04 ENCOUNTER — ASOB RESULT (OUTPATIENT)
Age: 35
End: 2025-08-04

## 2025-08-04 DIAGNOSIS — O35.9XX0 MATERNAL CARE FOR (SUSPECTED) FETAL ABNORMALITY AND DAMAGE, UNSPECIFIED, NOT APPLICABLE OR UNSPECIFIED: ICD-10-CM

## 2025-08-04 PROCEDURE — 76813 OB US NUCHAL MEAS 1 GEST: CPT

## 2025-08-04 PROCEDURE — 36415 COLL VENOUS BLD VENIPUNCTURE: CPT

## 2025-08-05 LAB
1ST TRIMESTER SCN+NT PATIENT-IMP: NORMAL
1ST TRIMESTER SCN+NT PATIENT-IMP: NORMAL
2ND TRIMESTER 4 SCREEN SERPL-IMP: NO
2ND TRIMESTER 4 SCREEN SERPL-IMP: NORMAL
AFP ADJ MOM SERPL: 1.82
AFP PERCENTILE: 92.4
AFP SERPL-MCNC: 18.7
AGE AT DELIVERY: 33.2
B-HCG FREE MOM PRCTL SERPL: 25.9
B-HCG FREE MOM PRCTL SERPL: 84.2
B-HCG FREE MOM SERPL: 1.85
B-HCG FREE SERPL-MCNC: 59.29 NG/ML
CHORION TYPE: NORMAL
CURRENT SMOKER: NO
DIABETES STATUS PATIENT: NO
EGG DONOR AGE: 0
FET CRL US.MEAS: 54.8
FET NASAL BN: PRESENT
FET NUCHAL FOLD MOM THICKNESS US.MEAS: 1.3
FET NUCHAL FOLD MOM THICKNESS US.MEAS: NORMAL
FET TS 18 PRIOR RISK FROM MAT AGE: NORMAL
FET TS 21 RISK FROM MAT AGE: NORMAL
GA: NORMAL
GA: NORMAL
HX OF TRISOMY 21 QL: NO
INHIBIN A ADJ MOM SERPL: 0.57
INHIBIN A SERPL-MCNC: 121.2 PG/ML
INHIBIN PERCENTILE: 13.4
MATERNAL RISK FACTORS: NORMAL
MULTIPLE PREGNANCY: NORMAL
NT  MOM: 0.91
PAPP-A ADJ MOM SERPL: 1.01
PAPP-A MOM PRCTL SERPL: 51
PAPP-A SERPL-ACNC: 1730 MU/L
PIGF SER-MCNC: 26.92 PG/ML
PLGF MOM: 0.77
SONOGRAPHER NAME: NORMAL
TEST PERFORMANCE INFO SPEC: NORMAL
TRISOMY 18+13 RISK FETUS: NORMAL
TS 21 RISK FETUS: NORMAL
US DATE: NORMAL

## 2025-08-20 ENCOUNTER — APPOINTMENT (OUTPATIENT)
Dept: HEMATOLOGY ONCOLOGY | Facility: CLINIC | Age: 35
End: 2025-08-20